# Patient Record
Sex: FEMALE | Race: OTHER | Employment: FULL TIME | URBAN - METROPOLITAN AREA
[De-identification: names, ages, dates, MRNs, and addresses within clinical notes are randomized per-mention and may not be internally consistent; named-entity substitution may affect disease eponyms.]

---

## 2022-07-19 ENCOUNTER — OFFICE VISIT (OUTPATIENT)
Dept: ORTHOPEDIC SURGERY | Age: 42
End: 2022-07-19
Payer: COMMERCIAL

## 2022-07-19 VITALS
BODY MASS INDEX: 39.27 KG/M2 | HEIGHT: 64 IN | HEART RATE: 97 BPM | SYSTOLIC BLOOD PRESSURE: 144 MMHG | WEIGHT: 230 LBS | DIASTOLIC BLOOD PRESSURE: 76 MMHG

## 2022-07-19 DIAGNOSIS — M25.521 PAIN OF BOTH ELBOWS: Primary | ICD-10-CM

## 2022-07-19 DIAGNOSIS — M77.11 BILATERAL TENNIS ELBOW: Primary | ICD-10-CM

## 2022-07-19 DIAGNOSIS — M77.12 BILATERAL TENNIS ELBOW: Primary | ICD-10-CM

## 2022-07-19 DIAGNOSIS — M25.522 PAIN OF BOTH ELBOWS: Primary | ICD-10-CM

## 2022-07-19 PROCEDURE — 99203 OFFICE O/P NEW LOW 30 MIN: CPT | Performed by: PHYSICIAN ASSISTANT

## 2022-07-19 PROCEDURE — 20611 DRAIN/INJ JOINT/BURSA W/US: CPT | Performed by: PHYSICIAN ASSISTANT

## 2022-07-19 RX ORDER — LISINOPRIL 10 MG/1
10 TABLET ORAL DAILY
COMMUNITY
Start: 2022-07-16 | End: 2022-09-29

## 2022-07-19 RX ORDER — LIDOCAINE HYDROCHLORIDE 10 MG/ML
4 INJECTION, SOLUTION INFILTRATION; PERINEURAL ONCE
Status: COMPLETED | OUTPATIENT
Start: 2022-07-19 | End: 2022-07-19

## 2022-07-19 RX ORDER — FAMOTIDINE 20 MG/1
20 TABLET, FILM COATED ORAL 2 TIMES DAILY
COMMUNITY

## 2022-07-19 RX ORDER — ALBUTEROL SULFATE 2.5 MG/3ML
3 SOLUTION RESPIRATORY (INHALATION)
COMMUNITY

## 2022-07-19 RX ORDER — TRIAMCINOLONE ACETONIDE 40 MG/ML
40 INJECTION, SUSPENSION INTRA-ARTICULAR; INTRAMUSCULAR ONCE
Status: COMPLETED | OUTPATIENT
Start: 2022-07-19 | End: 2022-07-19

## 2022-07-19 RX ORDER — CETIRIZINE HYDROCHLORIDE 10 MG/1
10 TABLET ORAL DAILY
COMMUNITY

## 2022-07-19 RX ADMIN — TRIAMCINOLONE ACETONIDE 40 MG: 40 INJECTION, SUSPENSION INTRA-ARTICULAR; INTRAMUSCULAR at 16:38

## 2022-07-19 RX ADMIN — LIDOCAINE HYDROCHLORIDE 4 ML: 10 INJECTION, SOLUTION INFILTRATION; PERINEURAL at 16:37

## 2022-07-19 ASSESSMENT — ENCOUNTER SYMPTOMS
CONSTIPATION: 0
SHORTNESS OF BREATH: 0
APNEA: 0
NAUSEA: 0
COLOR CHANGE: 0
VOMITING: 0
CHEST TIGHTNESS: 0
ABDOMINAL PAIN: 0
RESPIRATORY NEGATIVE: 1
ABDOMINAL DISTENTION: 0
DIARRHEA: 0
COUGH: 0

## 2022-07-19 NOTE — PROGRESS NOTES
[address]  [facility]INE  [address] [place] [address]  Dept: [phone]  Dept Fax: [phone]        Bilateral Elbow- New patient      Subjective:     Chief Complaint   Patient presents with    Elbow Pain     Bilateral     HPI:     Lakeshia Paul is a 39y.o. year old right hand dominant female that has had pain in the bilateral Elbow for 4 weeks. As far as any trauma to the elbow, the patient indicates none. The pain is is the worse at night and when doing activities that involve lifting with the elbow. The pain restricts activities such as lifting and gripping. The pain does not seem to improve with time. The patient has tried Advil, rest, exercises. She has had this on and off since 2016 during both of her pregnancies. She has been back to work since March and has increased pain in both elbows. ROS:   Review of Systems   Constitutional:  Positive for activity change. Negative for appetite change, fatigue and fever. Respiratory: Negative. Negative for apnea, cough, chest tightness and shortness of breath. Cardiovascular: Negative. Negative for chest pain, palpitations and leg swelling. Gastrointestinal:  Negative for abdominal distention, abdominal pain, constipation, diarrhea, nausea and vomiting. Genitourinary:  Negative for difficulty urinating, dysuria and hematuria. Musculoskeletal:  Positive for arthralgias. Negative for gait problem, joint swelling and myalgias. Skin:  Negative for color change and rash. Neurological:  Negative for dizziness, weakness, numbness and headaches. Psychiatric/Behavioral:  Negative for sleep disturbance. Past Medical History:    No past medical history on file. Past Surgical History:    No past surgical history on file.     CurrentMedications:   Current Outpatient Medications   Medication Sig Dispense Refill    lisinopril (PRINIVIL;ZESTRIL) 10 MG tablet Take 10 mg by mouth in the morning. albuterol (PROVENTIL) (2.5 MG/3ML) 0.083% nebulizer solution 3 mLs      cetirizine (ZYRTEC) 10 MG tablet Take 10 mg by mouth in the morning. famotidine (PEPCID) 20 MG tablet Take 20 mg by mouth in the morning and 20 mg before bedtime. No current facility-administered medications for this visit. Allergies:    Latex and Azithromycin    Social History:   Social History     Socioeconomic History    Marital status: Legally      Spouse name: None    Number of children: None    Years of education: None    Highest education level: None   Tobacco Use    Smoking status: Never    Smokeless tobacco: Never       Family History:  No family history on file. I have reviewed the CC, HPI, ROS, PMH, FHX, Social History, and if not present in this note, I have reviewed in the patient's chart. I agree with the documentation provided by other staff and have reviewed their documentation prior to providing my signature indicating agreement. Vitals:   BP (!) 144/76   Pulse 97   Ht 5' 4\" (1.626 m)   Wt 230 lb (104.3 kg)   BMI 39.48 kg/m²  Body mass index is 39.48 kg/m². Physical Examination:     Orthopedics:    GENERAL: Alert and oriented X3 in no acute distress. SKIN: Intact without lesions or ulcerations. NEURO: Musculoskeletal and axillary nerves intact to sensory and motor testing. VASC: Capillary refill is less than 3 seconds. ELBOW Exam    LOCATION: bilateral Elbow  MUSC: Good strength is available in these motions. TESTS: Ligamentous exam is stable to varus and valgus stress testing at 0 and 30 degrees. negative Tinel's sign at elbow. + pain on resisted wrist extension. ROM: 130 degrees of Flexion, 0 degrees of Extension, 90 degrees of Pronation, 90 degrees of Supination  INSPECTION: The patient is tender to palpation over the lateral epicondyle bilaterally. There is no effusion. No obvious deformity of the elbow.  No debra pain or instability to palpation. Assessment:     1. Bilateral tennis elbow      Procedures:    Procedure: yes    Lateral Epicondyle Elbow Injection    Location: Bilateral Elbow  Procedure: The point of maximum tenderness just distal to the lateral epicondyle was identified by palpation and marked with a hollow end of a click type ball point pen. The skin was prepped with a Betadine swab in a sterile fashion. Ultrasound was used to identify the tendinous insertion at the lateral epicondyle. The tendinous insertion was injected under sterile technique with a 3 cc solution containing 2 cc of 1% Lidocaineand 1 cc (40mg) of Depo Medrol. The skin was cleansed and a Band-Aid was placed. The patient tolerated the procedure without difficulty. The patient was told to watch for signs of infection and to call immediately with any problems. Radiology:   XR ELBOW LEFT (MIN 3 VIEWS)    Result Date: 7/20/2022  ELBOW X-RAY Three views of the bilateral elbows were obtained today. This includes AP, Oblique and lateral x-rays. The x-rays of the elbow reveal  no acute fractures, dislocations or soft tissue or bony abnormalities present. No radiopaque foreign bodies. Impression: Negative radiographs of the bilateral elbows. XR ELBOW RIGHT (MIN 3 VIEWS)    Result Date: 7/20/2022  ELBOW X-RAY Three views of the bilateral elbows were obtained today. This includes AP, Oblique and lateral x-rays. The x-rays of the elbow reveal  no acute fractures, dislocations or soft tissue or bony abnormalities present. No radiopaque foreign bodies. Impression: Negative radiographs of the bilateral elbows. Plan:   Treatment : I reviewed the X-ray with the patient and I informed them that the x-ray was negative for any bony abnormalities.  We discussed the etiologies and natural histories of lateral epicondylitis bilateral. We discussed the various treatment alternatives including anti-inflammatory medications, physical therapy, injections, further imaging studies and as a last result surgery. During today's visit, we discussed that she has a history of lateral epicondylitis. 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 She also has a new baby and does lots of lifting. The patient has opted for a cortisone injection into the bilateral lateral epicondyles to help reduce inflammation and pain. The injection site should never get red, hot, or swollen and if it does the patient will contact our office right away. The patient may experience a increase in soreness the first 24-48 hours due to a cortisone flair and can take anti-inflammatories for a short period of time to reduce that soreness. The patient should not submerge the injection site in water for a minimum of 24 hours to avoid infection. This means no lakes, pools, ponds, or hot tubs for 24 hours. If the patient is diabetic the injection may increase their blood sugar for up to one week. The patient can do this cortisone injection once every 3 months as needed. If the injections stop working and do not give the patient relief the patient should consider surgical interventions to produce long term relief. A physical therapy prescription was given. I did include some stretches in her after visit summary. She was also given bilateral cock up wrist splints that she can wear to help with carpal tunnel. The injection should help with her pain and hopefully keep it away for long period of time. Patient should return to the clinic in 6 weeks or PRN to follow up with  Ronny Morillo PA-C. The patient will call the office immediately with any problems. Orders Placed This Encounter   Medications    lidocaine 1 % injection 4 mL    triamcinolone acetonide (KENALOG-40) injection 40 mg    triamcinolone acetonide (KENALOG-40) injection 40 mg         No orders of the defined types were placed in this encounter.       Electronically signed by Bee Browne PA-C, on 7/21/2022 at 7:24 AM

## 2022-09-14 ENCOUNTER — HOSPITAL ENCOUNTER (EMERGENCY)
Age: 42
Discharge: HOME | End: 2022-09-14
Attending: EMERGENCY MEDICINE
Payer: COMMERCIAL

## 2022-09-14 VITALS
DIASTOLIC BLOOD PRESSURE: 106 MMHG | HEART RATE: 106 BPM | BODY MASS INDEX: 38.41 KG/M2 | HEIGHT: 64 IN | TEMPERATURE: 99.6 F | RESPIRATION RATE: 14 BRPM | SYSTOLIC BLOOD PRESSURE: 167 MMHG | WEIGHT: 225 LBS | OXYGEN SATURATION: 100 %

## 2022-09-14 DIAGNOSIS — R21 RASH AND OTHER NONSPECIFIC SKIN ERUPTION: ICD-10-CM

## 2022-09-14 DIAGNOSIS — B35.9 TINEA: Primary | ICD-10-CM

## 2022-09-14 LAB
ABSOLUTE EOS #: 0.5 K/UL (ref 0–0.4)
ABSOLUTE LYMPH #: 2.2 K/UL (ref 1–4.8)
ABSOLUTE MONO #: 0.8 K/UL (ref 0.1–1.2)
ALBUMIN SERPL-MCNC: 3.8 G/DL (ref 3.5–5.2)
ALBUMIN/GLOBULIN RATIO: 1.3 (ref 1–2.5)
ALP BLD-CCNC: 103 U/L (ref 35–104)
ALT SERPL-CCNC: 17 U/L (ref 5–33)
AMYLASE: 45 U/L (ref 28–100)
ANION GAP SERPL CALCULATED.3IONS-SCNC: 12 MMOL/L (ref 9–17)
AST SERPL-CCNC: 12 U/L
BASOPHILS # BLD: 0 % (ref 0–2)
BASOPHILS ABSOLUTE: 0 K/UL (ref 0–0.2)
BILIRUB SERPL-MCNC: 0.8 MG/DL (ref 0.3–1.2)
BILIRUBIN DIRECT: 0.1 MG/DL
BILIRUBIN, INDIRECT: 0.7 MG/DL (ref 0–1)
BUN BLDV-MCNC: 16 MG/DL (ref 6–20)
C-REACTIVE PROTEIN: 17.3 MG/L (ref 0–5)
CALCIUM SERPL-MCNC: 8.8 MG/DL (ref 8.6–10.4)
CHLORIDE BLD-SCNC: 96 MMOL/L (ref 98–107)
CO2: 27 MMOL/L (ref 20–31)
CREAT SERPL-MCNC: 0.62 MG/DL (ref 0.5–0.9)
EOSINOPHILS RELATIVE PERCENT: 4 % (ref 1–4)
GFR AFRICAN AMERICAN: >60 ML/MIN
GFR NON-AFRICAN AMERICAN: >60 ML/MIN
GFR SERPL CREATININE-BSD FRML MDRD: ABNORMAL ML/MIN/{1.73_M2}
GLUCOSE BLD-MCNC: 90 MG/DL (ref 70–99)
HCT VFR BLD CALC: 39.1 % (ref 36–46)
HEMOGLOBIN: 13 G/DL (ref 12–16)
LIPASE: 40 U/L (ref 13–60)
LYMPHOCYTES # BLD: 17 % (ref 24–44)
MCH RBC QN AUTO: 30 PG (ref 26–34)
MCHC RBC AUTO-ENTMCNC: 33.3 G/DL (ref 31–37)
MCV RBC AUTO: 90.4 FL (ref 80–100)
MONOCYTES # BLD: 7 % (ref 2–11)
PDW BLD-RTO: 15.3 % (ref 12.5–15.4)
PLATELET # BLD: 285 K/UL (ref 140–450)
PMV BLD AUTO: 7.1 FL (ref 6–12)
POTASSIUM SERPL-SCNC: 3.5 MMOL/L (ref 3.7–5.3)
RBC # BLD: 4.33 M/UL (ref 4–5.2)
SEDIMENTATION RATE, ERYTHROCYTE: 13 MM/HR (ref 0–20)
SEG NEUTROPHILS: 72 % (ref 36–66)
SEGMENTED NEUTROPHILS ABSOLUTE COUNT: 9.4 K/UL (ref 1.8–7.7)
SODIUM BLD-SCNC: 135 MMOL/L (ref 135–144)
TOTAL PROTEIN: 6.8 G/DL (ref 6.4–8.3)
WBC # BLD: 13 K/UL (ref 3.5–11)

## 2022-09-14 PROCEDURE — 80076 HEPATIC FUNCTION PANEL: CPT

## 2022-09-14 PROCEDURE — 85025 COMPLETE CBC W/AUTO DIFF WBC: CPT

## 2022-09-14 PROCEDURE — 36415 COLL VENOUS BLD VENIPUNCTURE: CPT

## 2022-09-14 PROCEDURE — 86140 C-REACTIVE PROTEIN: CPT

## 2022-09-14 PROCEDURE — 82150 ASSAY OF AMYLASE: CPT

## 2022-09-14 PROCEDURE — 85652 RBC SED RATE AUTOMATED: CPT

## 2022-09-14 PROCEDURE — 99283 EMERGENCY DEPT VISIT LOW MDM: CPT

## 2022-09-14 PROCEDURE — 83690 ASSAY OF LIPASE: CPT

## 2022-09-14 PROCEDURE — 87040 BLOOD CULTURE FOR BACTERIA: CPT

## 2022-09-14 PROCEDURE — 80048 BASIC METABOLIC PNL TOTAL CA: CPT

## 2022-09-14 RX ORDER — LEVALBUTEROL TARTRATE 45 UG/1
1-2 AEROSOL, METERED ORAL EVERY 4 HOURS PRN
COMMUNITY

## 2022-09-14 RX ORDER — NYSTATIN 10B UNIT
POWDER (EA) MISCELLANEOUS 2 TIMES DAILY
COMMUNITY

## 2022-09-14 RX ORDER — ITRACONAZOLE 100 MG/1
CAPSULE ORAL DAILY
COMMUNITY

## 2022-09-14 RX ORDER — CLOTRIMAZOLE 1 %
CREAM (GRAM) TOPICAL 2 TIMES DAILY
COMMUNITY

## 2022-09-14 NOTE — DISCHARGE INSTRUCTIONS
Please continue use of Nystatin powder as well as oral Antifungal as previously directed. DERMATOLOGY CLINIC LIST:     [facility] [address] [facility] [address]   [id number]                 [facility] [address] [facility] [place] [address]   [phone]                 [facility]at[facility] [address] [place] [address]   [phone]       [id number]         [facility] [address] [place] [address] [facility] [address]   [id number]       [phone]          [address] [facility] Dermatology [address] [facility]   [phone]                  [address] [facility] Dermatology [address]  [facility] [address]   [phone]                 [facility] [address] [facility] [address]   [phone]                 [facility]   [phone]                 [facility]   [phone]                 [address] [facility] [address] [facility] [address]   [id number]                              [facility] Dermatology [address] [facility]   [id number]                 [facility] [address] [place] [address]   [id number]       [id number]         [facility] [address] [facility] [address]   [id number]                 [address] [place] [address] Dermatology [address] [facility]       [id number]         [facility] Dermatology [address] [facility] [address]   [phone]                 [facility] [address] [facility] [address]   [phone]                 [facility] [place] [address] [facility] [address] [facility] [address]   [phone]                 [facility] Dermatology [address] [facility] [address] [facility]   [phone]                 [address] [facility] Dermatology [address] [facility] [address] [place] [address]   [phone]                 [facility] [address] [facility] [address] [place] [address] [phone]                 [facility] Dermatology [address]   [phone]                 [facility] Dermatology [address] [facility] [address]   [phone]                 [facility] Dermatology [address]  [facility] [facility] [address]   [phone]

## 2022-09-14 NOTE — ED PROVIDER NOTES
[address] [facility] ED  [address] [facility] [address] [facility] [address]  Phone: [phone]  Fax: [phone]      eMERGENCY dEPARTMENT eNCOUnter      Pt Name: Jai Jeter  MRN: [id number]  [place] 1980  Date of evaluation: 9/14/22      CHIEF COMPLAINT:  Chief Complaint   Patient presents with    Rash     Patient has a rash that started on her right chest and has spread across her chest and back. Patient has been on steroid therapy and antibiotics. Patient has used anti fungal cream.  Patient was seen at [facility] ER and urgent cares. Started last week. HISTORY OF PRESENT ILLNESS    Jai Jeter is a 39 y.o. female who presents with rash complaint:    Location/Symptom:   Rash on body  Timing/Onset:   10 to 14 days  Context/Setting: Patient here for reevaluation of significant itching rash that is been going on for upwards of 10 to 14 days. First evaluation was at an urgent care 7 days ago. At the time she was diagnosed with tinea corporis and provided with nystatin powder as well as oral prednisone taper. Patient states that the prednisone does not seem to help with the rash or itching but states that in certain areas the nystatin powder is helping. She was seen at [facility] emergency department yesterday and was diagnosed again with tinea and provided with an oral antifungal.  She does have a history of mast cell dysfunction, she denies any associated fever/chills/nausea/vomiting. There is no other chest/respiratory/abdominal/urinary pain or symptoms. She is having no sloughing, pain or disruption of any of her mucosal surfaces. She has some history of eczema previously, denies any other diagnosed dermatological past medical history. There are some areas that she reports that are very irritated and open but denies any significant bleeding or purulent discharge.     Quality:   itchy  Duration:   constant  Modifying Factors:   none  Severity:   Moderate    Nursing Notes were reviewed. REVIEW OF SYSTEMS       Constitutional: Denies recent fever, chills. Eyes: No eye pain. No vision changes. Neck: No neck pain. Respiratory: Denies recent shortness of breath. Cardiac:  Denies recent chest pain. GI:  Denies abdominal pain/nausea/vomiting/diarrhea. : Denies dysuria. Musculoskeletal: Denies focal weakness. Neurologic: denies headache or focal weakness. Skin:  Rash     Negative in 10 essential Systems except as mentioned above and in the HPI. PAST MEDICAL HISTORY   PMH:  has no past medical history on file. Surgical History:  has no past surgical history on file. Social History:  reports that she has never smoked. She has never used smokeless tobacco.  Family History: None  Psychiatric History: None    Allergies:is allergic to latex, azithromycin, erythromycin, and neomycin. PHYSICAL EXAM     INITIAL VITALS: BP (!) 167/106   Pulse (!) 106   Temp 99.6 °F (37.6 °C) (Oral)   Resp 14   Ht 5' 4\" (1.626 m)   Wt 102.1 kg (225 lb)   LMP 09/02/2022 (Exact Date)   SpO2 100%   BMI 38.62 kg/m²   Constitutional:  Well developed, no distress or malaise. Well-appearing. Eyes:  Pupils equal/round  HENT:  Atraumatic, external ears normal, nose normal.    Respiratory:  Clear to auscultation bilaterally with good air exchange, no W/R/R  Cardiovascular:  RRR with normal S1 and S2  Gastrointestinal/Abdomen:  Soft, NT.      Musculoskeletal:   Normal to inspection  Back:  No CVA tenderness. Integument:   . Distribution and appearance of rash about her torso. Some areas are macular papular and appearance consistent with a viral exanthem type rash. There are other areas within her skin folds that have more of a tinea corporis appearance. Larger area of her right lateral abdominal wall that is thicker and slightly raised with some excoriation which appears to be from scratching. There is no signs of any secondary infection about any of these areas.   The rash is bilateral and clustered in the areas as well as the macular papular being a little more evenly distributed. There are some areas of vesicular reaction to these but there is not a generalized look of dermatomal/vesicular appearance consistent with zoster. There are no signs of secondary infection or cellulitis. Some scant crusting in areas that is serous in nature. Neurologic:  Alert, appropriate mentation/interaction, no focal deficits noted       DIAGNOSTIC RESULTS     EKG: All EKG's are interpreted by the Emergency Department Physician who either signs or Co-signs this chart in the absence of a cardiologist.  Not indicated    RADIOLOGY:   Reviewed the radiologist:  No orders to display     Not indicated      LABS:  Labs Reviewed   CULTURE, BLOOD 1   CULTURE, BLOOD 1   CBC WITH AUTO DIFFERENTIAL   BASIC METABOLIC PANEL   AMYLASE   LIPASE   HEPATIC FUNCTION PANEL   C-REACTIVE PROTEIN   SEDIMENTATION RATE         EMERGENCY DEPARTMENT COURSE:     1407  Nontoxic, no distress. She has varied rash presentation. Some areas are consistent with tinea, some others are more of a pap/mac rash. No overt signs of Zoster as bilat and scattered, some vesicular reaction to areas but a consistently vesicular/dermatomal appearance. I am having attending look at this as well. Pt is taking Nystatin with some relief in certain area and presently just started oral Antifungal yesterday. No constitutional symptoms or other acute findings on PE. Attending ordering labs for outpt f/u. Continue with topical and oral medicines with use of H1/H2 blockers for her itching symptoms. Attending discharged this patient after discussing all labwork/imaging results that were finalized. Treatment plan and recommended follow-up discussed with them as well. She needs outpt Derm f/u, providing list that I have but no updated [facility] contact within [employer].           No orders of the defined types were placed in this encounter. CONSULTS:  None      FINAL IMPRESSION      1. Tinea    2.  Rash and other nonspecific skin eruption          DISPOSITION/PLAN:  DISPOSITION Decision To Discharge 09/14/2022 02:00:46 PM        PATIENT REFERRED TO:  None Provider    Schedule an appointment as soon as possible for a visit in 2 days  for re-evaluation of your symptoms      DISCHARGE MEDICATIONS:  New Prescriptions    No medications on file       (Please note that portions of this note were completed with a voice recognition program.  Efforts were made to edit the dictations but occasionally words are mis-transcribed.)    ELEAZAR Sargent PA-C  09/14/22 4536

## 2022-09-14 NOTE — ED PROVIDER NOTES
documentation    LABS:  No results found for this visit on 09/14/22. Not indicated unless otherwise documented above or in the midlevel documentation    RADIOLOGY:   I reviewedthe radiologist interpretations:  No orders to display       Not indicated unless otherwise documented above or in the midlevel documentation    EMERGENCY DEPARTMENT COURSE:       PERTINENT ATTENDING PHYSICIAN COMMENTS:    Worsening rash has been to 3 emergency departments. Symptoms started after she was being treated for a viral infection several weeks ago on Zithromax with an allergy to mycins. This started after that. She did was then treated for possible fungal rash and given steroids which seem to make things worse so she stopped the steroids. She is now on an oral antifungal medication. Recommend following up with dermatology. At this point without taking steroids she can continue Benadryl she could also do Pepcid. This does not appear to be shingles it does cross midline. Recommend following up. No orders of the defined types were placed in this encounter. Faculty Attestation    I performed a history and physical examination of the patient and discussed management with the mid level provideer. I reviewed the mid level provider's note and agree with the documented findings and plan of care. Any areas of disagreement are noted on the chart. I was personally present for the key portions of any procedures. I have documented in the chart those procedures where I was not present during the key portions. I have reviewed the emergency nurses triage note. I agree with the chief complaint, past medical history, past surgical history, allergies, medications, social and family history as documented unless otherwise noted below. Documentation of the HPI, Physical Exam and Medical Decision Making performed by medical students or scribes is based on my personal performance of the HPI, PE and MDM.  For Physician Assistant/ Nurse Practitioner cases/documentation I have personally evaluated this patient and have completed at least one if not all key elements of the E/M (history, physical exam, and MDM). Additional findings are as noted.      Kim Boston DO  09/14/22 1423

## 2022-09-19 LAB
CULTURE: NORMAL
CULTURE: NORMAL
Lab: NORMAL
Lab: NORMAL
SPECIMEN DESCRIPTION: NORMAL
SPECIMEN DESCRIPTION: NORMAL

## 2022-09-29 ENCOUNTER — OFFICE VISIT (OUTPATIENT)
Dept: FAMILY MEDICINE CLINIC | Age: 42
End: 2022-09-29
Payer: COMMERCIAL

## 2022-09-29 VITALS
HEIGHT: 64 IN | TEMPERATURE: 98.4 F | DIASTOLIC BLOOD PRESSURE: 93 MMHG | SYSTOLIC BLOOD PRESSURE: 144 MMHG | HEART RATE: 56 BPM | WEIGHT: 233 LBS | BODY MASS INDEX: 39.78 KG/M2 | OXYGEN SATURATION: 98 %

## 2022-09-29 DIAGNOSIS — H65.91 FLUID LEVEL BEHIND TYMPANIC MEMBRANE OF RIGHT EAR: ICD-10-CM

## 2022-09-29 DIAGNOSIS — Z87.09 HISTORY OF ASTHMA: ICD-10-CM

## 2022-09-29 DIAGNOSIS — R03.0 ELEVATED BLOOD PRESSURE READING: ICD-10-CM

## 2022-09-29 DIAGNOSIS — J06.9 ACUTE URI: Primary | ICD-10-CM

## 2022-09-29 PROBLEM — G90.A POTS (POSTURAL ORTHOSTATIC TACHYCARDIA SYNDROME): Status: ACTIVE | Noted: 2022-09-29

## 2022-09-29 PROBLEM — G43.909 MIGRAINE: Status: ACTIVE | Noted: 2022-09-29

## 2022-09-29 PROBLEM — O11.9 CHRONIC HYPERTENSION WITH SUPERIMPOSED PREECLAMPSIA: Status: ACTIVE | Noted: 2021-03-19

## 2022-09-29 PROBLEM — K21.9 GERD (GASTROESOPHAGEAL REFLUX DISEASE): Status: ACTIVE | Noted: 2022-09-29

## 2022-09-29 PROBLEM — E78.5 HYPERLIPIDEMIA: Status: ACTIVE | Noted: 2022-09-29

## 2022-09-29 PROBLEM — O09.299 HISTORY OF PRE-ECLAMPSIA IN PRIOR PREGNANCY, CURRENTLY PREGNANT: Status: ACTIVE | Noted: 2020-08-24

## 2022-09-29 PROBLEM — F33.0 MDD (MAJOR DEPRESSIVE DISORDER), RECURRENT EPISODE, MILD (HCC): Status: ACTIVE | Noted: 2019-10-01

## 2022-09-29 PROBLEM — R76.8 ELEVATED IGE LEVEL: Status: ACTIVE | Noted: 2020-03-02

## 2022-09-29 PROBLEM — E11.9 TYPE 2 DIABETES MELLITUS (HCC): Status: ACTIVE | Noted: 2020-10-22

## 2022-09-29 PROBLEM — E55.9 VITAMIN D DEFICIENCY: Status: ACTIVE | Noted: 2021-04-26

## 2022-09-29 PROBLEM — L50.8 URTICARIA, CHRONIC: Status: ACTIVE | Noted: 2017-05-19

## 2022-09-29 PROCEDURE — 99204 OFFICE O/P NEW MOD 45 MIN: CPT | Performed by: NURSE PRACTITIONER

## 2022-09-29 RX ORDER — MECLIZINE HCL 12.5 MG/1
12.5 TABLET ORAL 3 TIMES DAILY PRN
Qty: 15 TABLET | Refills: 0 | Status: SHIPPED | OUTPATIENT
Start: 2022-09-29 | End: 2022-10-09

## 2022-09-29 RX ORDER — DOXYCYCLINE HYCLATE 100 MG
100 TABLET ORAL 2 TIMES DAILY
Qty: 20 TABLET | Refills: 0 | Status: SHIPPED | OUTPATIENT
Start: 2022-09-29

## 2022-09-29 RX ORDER — PREDNISONE 20 MG/1
20 TABLET ORAL 2 TIMES DAILY
Qty: 10 TABLET | Refills: 0 | Status: SHIPPED | OUTPATIENT
Start: 2022-09-29 | End: 2022-10-04

## 2022-09-29 ASSESSMENT — ENCOUNTER SYMPTOMS
COUGH: 1
CHEST TIGHTNESS: 1
NAUSEA: 1
RHINORRHEA: 0
WHEEZING: 1
SORE THROAT: 0
SHORTNESS OF BREATH: 0
EYE PAIN: 0
VOMITING: 1
DIARRHEA: 1

## 2022-09-29 NOTE — PROGRESS NOTES
CNP   nystatin (MYCOSTATIN) POWD powder Apply topically 2 times daily  Patient not taking: Reported on 9/29/2022  Historical Provider, MD   itraconazole (SPORANOX) 100 MG capsule Take by mouth daily  Patient not taking: Reported on 9/29/2022  Historical Provider, MD   levalbuterol [facility]) 45 MCG/ACT inhaler Inhale 1-2 puffs into the lungs every 4 hours as needed for Wheezing  Historical Provider, MD   clotrimazole (LOTRIMIN) 1 % cream Apply topically 2 times daily Apply topically 2 times daily. Patient not taking: Reported on 9/29/2022  Historical Provider, MD   albuterol (PROVENTIL) (2.5 MG/3ML) 0.083% nebulizer solution 3 mLs  Historical Provider, MD   cetirizine (ZYRTEC) 10 MG tablet Take 10 mg by mouth in the morning. Historical Provider, MD   famotidine (PEPCID) 20 MG tablet Take 20 mg by mouth in the morning and 20 mg before bedtime. Historical Provider, MD       Allergies   Allergen Reactions    Latex      hives    Azithromycin Shortness Of Breath    Bacitracin Rash    Influenza Vaccines Anaphylaxis    Pneumococcal Vaccines Anaphylaxis    Erythromycin Rash    Neomycin Rash         Subjective:      Review of Systems   Constitutional:  Positive for chills and fever. HENT:  Positive for congestion and ear pain. Negative for rhinorrhea and sore throat. Eyes:  Negative for pain and visual disturbance. Respiratory:  Positive for cough, chest tightness and wheezing. Negative for shortness of breath. Cardiovascular:  Negative for chest pain, palpitations and leg swelling. Gastrointestinal:  Positive for diarrhea, nausea and vomiting. Genitourinary:  Negative for decreased urine volume and difficulty urinating. Musculoskeletal:  Negative for gait problem, myalgias and neck pain. Skin:  Negative for pallor and rash. Neurological:  Positive for dizziness. Negative for weakness, light-headedness and headaches. Psychiatric/Behavioral:  Negative for sleep disturbance.       Objective: Physical Exam  Vitals and nursing note reviewed. Constitutional:       General: She is not in acute distress. Appearance: Normal appearance. HENT:      Head: Normocephalic and atraumatic. Jaw: No trismus. Right Ear: Ear canal normal. A middle ear effusion is present. Left Ear: Tympanic membrane and ear canal normal.      Nose: Congestion present. Mouth/Throat:      Lips: Pink. Mouth: Mucous membranes are moist.      Pharynx: Oropharynx is clear. Uvula midline. Eyes:      Extraocular Movements: Extraocular movements intact. Conjunctiva/sclera: Conjunctivae normal.   Cardiovascular:      Rate and Rhythm: Regular rhythm. Bradycardia present. Pulses: Normal pulses. Pulmonary:      Effort: Pulmonary effort is normal. No tachypnea. Breath sounds: Wheezing and rhonchi present. Abdominal:      General: Bowel sounds are normal.      Palpations: Abdomen is soft. Musculoskeletal:         General: Normal range of motion. Cervical back: Normal range of motion and neck supple. Skin:     General: Skin is warm and dry. Capillary Refill: Capillary refill takes less than 2 seconds. Neurological:      Mental Status: She is alert and oriented to person, place, and time. Psychiatric:         Mood and Affect: Mood normal.         Thought Content: Thought content normal.         MEDICAL DECISION MAKING Assessment/Plan:     Deepti Carey was seen today for otalgia and nausea & vomiting. Diagnoses and all orders for this visit:    Acute URI  -     doxycycline hyclate (VIBRA-TABS) 100 MG tablet; Take 1 tablet by mouth 2 times daily  -     predniSONE (DELTASONE) 20 MG tablet; Take 1 tablet by mouth 2 times daily for 5 days    Fluid level behind tympanic membrane of right ear  -     meclizine (ANTIVERT) 12.5 MG tablet; Take 1 tablet by mouth 3 times daily as needed for Nausea or Dizziness  -     predniSONE (DELTASONE) 20 MG tablet;  Take 1 tablet by mouth 2 times daily for 5 days    History of asthma  -     predniSONE (DELTASONE) 20 MG tablet; Take 1 tablet by mouth 2 times daily for 5 days    Elevated blood pressure reading      Results for orders placed or performed during the hospital encounter of 09/14/22   Culture, Blood 1    Specimen: Blood   Result Value Ref Range    Specimen Description . BLOOD     Special Requests 20ML LEFT ARM     Culture NO GROWTH 5 DAYS    Culture, Blood 1    Specimen: Blood   Result Value Ref Range    Specimen Description . BLOOD     Special Requests 20ML RIGHT ARM     Culture NO GROWTH 5 DAYS    CBC with Auto Differential   Result Value Ref Range    WBC 13.0 (H) 3.5 - 11.0 k/uL    RBC 4.33 4.0 - 5.2 m/uL    Hemoglobin 13.0 12.0 - 16.0 g/dL    Hematocrit 39.1 36 - 46 %    MCV 90.4 80 - 100 fL    MCH 30.0 26 - 34 pg    MCHC 33.3 31 - 37 g/dL    RDW 15.3 12.5 - 15.4 %    Platelets 201 508 - 710 k/uL    MPV 7.1 6.0 - 12.0 fL    Seg Neutrophils 72 (H) 36 - 66 %    Lymphocytes 17 (L) 24 - 44 %    Monocytes 7 2 - 11 %    Eosinophils % 4 1 - 4 %    Basophils 0 0 - 2 %    Segs Absolute 9.40 (H) 1.8 - 7.7 k/uL    Absolute Lymph # 2.20 1.0 - 4.8 k/uL    Absolute Mono # 0.80 0.1 - 1.2 k/uL    Absolute Eos # 0.50 (H) 0.0 - 0.4 k/uL    Basophils Absolute 0.00 0.0 - 0.2 k/uL   Basic Metabolic Panel   Result Value Ref Range    Glucose 90 70 - 99 mg/dL    BUN 16 6 - 20 mg/dL    Creatinine 0.62 0.50 - 0.90 mg/dL    Calcium 8.8 8.6 - 10.4 mg/dL    Sodium 135 135 - 144 mmol/L    Potassium 3.5 (L) 3.7 - 5.3 mmol/L    Chloride 96 (L) 98 - 107 mmol/L    CO2 27 20 - 31 mmol/L    Anion Gap 12 9 - 17 mmol/L    GFR Non-African American >60 >60 mL/min    GFR African American >60 >60 mL/min    GFR Comment         Amylase   Result Value Ref Range    Amylase 45 28 - 100 U/L   Lipase   Result Value Ref Range    Lipase 40 13 - 60 U/L   Hepatic Function Panel   Result Value Ref Range    Albumin 3.8 3.5 - 5.2 g/dL    Alkaline Phosphatase 103 35 - 104 U/L    ALT 17 5 - 33 U/L    AST 12 <32 U/L    Total Bilirubin 0.8 0.3 - 1.2 mg/dL    Bilirubin, Direct 0.1 <0.31 mg/dL    Bilirubin, Indirect 0.7 0.00 - 1.00 mg/dL    Total Protein 6.8 6.4 - 8.3 g/dL    Albumin/Globulin Ratio 1.3 1.0 - 2.5   C-Reactive Protein   Result Value Ref Range    CRP 17.3 (H) 0.0 - 5.0 mg/L   Sedimentation Rate   Result Value Ref Range    Sed Rate 13 0 - 20 mm/Hr     Based on the patient's history and exam will treat as URI. The patient does not have clinical findings suggestive of pneumonia. There is no abnormal vital signs (pulse is not greater than 100/ minute, respirations are not greater than 24/ minute, temperature is not greater than 38 degrees Celsius, or oxygen saturation less than 95 percent) There is no tachypnea, rales, or signs of parenchymal consolidation on exam. There are no changes in mental status or behavioral changes. Pt to fill and take medications as prescribed. Doxy and Antivert to take as directed. History of asthma with little relief from inhalers and nebulizer. Will give short course of oral prednisone to help with symptoms. Rest, increase fluids. Return if no improvement in symptoms. Go to the ER for any emergent concern. Pt was advised that blood pressure is elevated today in the office. Instructed patient to follow up with PCP for further evaluation and treatment. Patient given educational materials - see patientinstructions. Discussed use, benefit, and side effects of prescribed medications. All patient questions answered. Pt verbalized understanding. Instructed to continue current medications, diet and exercise. Patient agreed with treatment plan. Follow up as directed.      Electronically signed by MAGDIEL Gant CNP on 9/29/2022 at 12:55 PM

## 2022-09-30 ENCOUNTER — TELEPHONE (OUTPATIENT)
Dept: PRIMARY CARE CLINIC | Age: 42
End: 2022-09-30

## 2022-09-30 DIAGNOSIS — T36.95XA ANTIBIOTIC-INDUCED YEAST INFECTION: Primary | ICD-10-CM

## 2022-09-30 DIAGNOSIS — B37.9 ANTIBIOTIC-INDUCED YEAST INFECTION: Primary | ICD-10-CM

## 2022-09-30 RX ORDER — FLUCONAZOLE 100 MG/1
100 TABLET ORAL DAILY
Qty: 3 TABLET | Refills: 0 | Status: SHIPPED | OUTPATIENT
Start: 2022-09-30 | End: 2022-10-03

## 2022-11-17 ENCOUNTER — OFFICE VISIT (OUTPATIENT)
Dept: ORTHOPEDIC SURGERY | Age: 42
End: 2022-11-17

## 2022-11-17 ENCOUNTER — OFFICE VISIT (OUTPATIENT)
Dept: ORTHOPEDIC SURGERY | Age: 42
End: 2022-11-17
Payer: COMMERCIAL

## 2022-11-17 VITALS — WEIGHT: 233 LBS | RESPIRATION RATE: 12 BRPM | HEIGHT: 64 IN | BODY MASS INDEX: 39.78 KG/M2

## 2022-11-17 VITALS — HEIGHT: 64 IN | WEIGHT: 233 LBS | BODY MASS INDEX: 39.78 KG/M2 | RESPIRATION RATE: 16 BRPM

## 2022-11-17 DIAGNOSIS — M54.2 NECK PAIN: Primary | ICD-10-CM

## 2022-11-17 DIAGNOSIS — M47.22 CERVICAL SPONDYLOSIS WITH RADICULOPATHY: ICD-10-CM

## 2022-11-17 DIAGNOSIS — M54.9 BACK PAIN, UNSPECIFIED BACK LOCATION, UNSPECIFIED BACK PAIN LATERALITY, UNSPECIFIED CHRONICITY: ICD-10-CM

## 2022-11-17 DIAGNOSIS — G89.29 CHRONIC BILATERAL LOW BACK PAIN WITH SCIATICA, SCIATICA LATERALITY UNSPECIFIED: Primary | ICD-10-CM

## 2022-11-17 DIAGNOSIS — M54.40 CHRONIC BILATERAL LOW BACK PAIN WITH SCIATICA, SCIATICA LATERALITY UNSPECIFIED: Primary | ICD-10-CM

## 2022-11-17 DIAGNOSIS — M54.2 NECK PAIN: ICD-10-CM

## 2022-11-17 PROCEDURE — 99204 OFFICE O/P NEW MOD 45 MIN: CPT | Performed by: PHYSICIAN ASSISTANT

## 2022-11-17 RX ORDER — MELOXICAM 15 MG/1
15 TABLET ORAL DAILY
Qty: 30 TABLET | Refills: 0 | Status: SHIPPED | OUTPATIENT
Start: 2022-11-17

## 2022-11-17 RX ORDER — CYCLOBENZAPRINE HCL 10 MG
10 TABLET ORAL NIGHTLY PRN
Qty: 30 TABLET | Refills: 0 | Status: SHIPPED | OUTPATIENT
Start: 2022-11-17 | End: 2022-11-27

## 2022-11-17 NOTE — PROGRESS NOTES
Patient ID: Dilia Galaviz is a 39 y.o. female    Chief Compliant:  No chief complaint on file. Diagnostic imaging:        Assessment and Plan:  No diagnosis found. Follow up ***    HPI:  This is a 39 y.o. female who presents to the clinic today as a new patient for low back evaluation. Review of Systems   All other systems reviewed and are negative. Past History:    Current Outpatient Medications:     doxycycline hyclate (VIBRA-TABS) 100 MG tablet, Take 1 tablet by mouth 2 times daily, Disp: 20 tablet, Rfl: 0    nystatin (MYCOSTATIN) POWD powder, Apply topically 2 times daily (Patient not taking: Reported on 9/29/2022), Disp: , Rfl:     itraconazole (SPORANOX) 100 MG capsule, Take by mouth daily (Patient not taking: Reported on 9/29/2022), Disp: , Rfl:     levalbuterol (XOPENEX HFA) 45 MCG/ACT inhaler, Inhale 1-2 puffs into the lungs every 4 hours as needed for Wheezing, Disp: , Rfl:     clotrimazole (LOTRIMIN) 1 % cream, Apply topically 2 times daily Apply topically 2 times daily. (Patient not taking: Reported on 9/29/2022), Disp: , Rfl:     albuterol (PROVENTIL) (2.5 MG/3ML) 0.083% nebulizer solution, 3 mLs, Disp: , Rfl:     cetirizine (ZYRTEC) 10 MG tablet, Take 10 mg by mouth in the morning., Disp: , Rfl:     famotidine (PEPCID) 20 MG tablet, Take 20 mg by mouth in the morning and 20 mg before bedtime. , Disp: , Rfl:   Allergies   Allergen Reactions    Latex      hives    Azithromycin Shortness Of Breath    Bacitracin Rash    Influenza Vaccines Anaphylaxis    Pneumococcal Vaccines Anaphylaxis    Erythromycin Rash    Neomycin Rash     Social History     Socioeconomic History    Marital status: Legally      Spouse name: Not on file    Number of children: Not on file    Years of education: Not on file    Highest education level: Not on file   Occupational History    Not on file   Tobacco Use    Smoking status: Never    Smokeless tobacco: Never   Substance and Sexual Activity Alcohol use: Not on file    Drug use: Not on file    Sexual activity: Not on file   Other Topics Concern    Not on file   Social History Narrative    Not on file     Social Determinants of Health     Financial Resource Strain: Not on file   Food Insecurity: Not on file   Transportation Needs: Not on file   Physical Activity: Not on file   Stress: Not on file   Social Connections: Not on file   Intimate Partner Violence: Not on file   Housing Stability: Not on file     No past medical history on file. No past surgical history on file. No family history on file. Physical Exam:  Vitals signs and nursing note reviewed. Constitutional:       Appearance: well-developed. HENT:      Head: Normocephalic and atraumatic. Nose: Nose normal.   Eyes:      Conjunctiva/sclera: Conjunctivae normal.   Neck:      Musculoskeletal: Normal range of motion and neck supple. Pulmonary:      Effort: Pulmonary effort is normal. No respiratory distress. Musculoskeletal:      Comments: Normal gait     Skin:     General: Skin is warm and dry. Neurological:      Mental Status: Alert and oriented to person, place, and time. Sensory: No sensory deficit. Psychiatric:         Behavior: Behavior normal.         Thought Content: Thought content normal.        Provider Attestation:  ***    Scribe Attestation:  By signing my name below, I, Kary Chandler, attest that this documentation has been prepared under the direction and in the presence of Dr. Allyn Verduzco. Electronically signed: Darien Baird, 11/17/22     Please note that this chart was generated using voice recognition Dragon dictation software. Although every effort was made to ensure the accuracy of this automated transcription, some errors in transcription may have occurred.

## 2022-11-17 NOTE — PROGRESS NOTES
[facility], [address] [place], [address] [place] [address], [address] [facility]           Dept Phone: [phone]           Dept Fax:  [address]           Petey Dave          Dept Phone: [phone]           Dept Fax:  [phone]      Chief Compliant:  Chief Complaint   Patient presents with    Neck Pain    Lower Back Pain        History of Present Illness: This is a 39 y.o. female who presents to the clinic today for evaluation of chronic neck and low back pain. Patient reports she has had issues with both areas for years but the neck has been significantly worse since June. Neck pain:  Patient reports she has had neck pain for quite some time however has been significantly worse over the last 5 months. 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 Patient reports pain is most severe to the bilateral paraspinal area with radiation up to the occiput. She does note radiation into bilateral upper extremities with numbness and tingling left greater than right however does admit she is dealing with bilateral SLAP lesion so unsure of the etiology of the pain. Back pain:  Patient reports back pain is more chronic in nature localized to the lumbar paraspinal area bilaterally. She reports a history of pregnancy-induced sciatica but they did seem to improve after giving birth. She reports that her back pain additionally has been worse over the past several months without any preceding injury or trauma. She states pain is also aggravated by transition from sit to stand and stand to sit.     Treatment attempted for both the neck and the back include chiropractic care, massage therapy and extensive physical therapy. Patient reports she has done both formal PT but more recently she has been working with a family member who is a orthopedic physical therapist with minimal improvement of pain. Treatment attempted includes Advil Tylenol and Lyrica. Past History:    Current Outpatient Medications:     doxycycline hyclate (VIBRA-TABS) 100 MG tablet, Take 1 tablet by mouth 2 times daily (Patient not taking: Reported on 11/17/2022), Disp: 20 tablet, Rfl: 0    nystatin (MYCOSTATIN) POWD powder, Apply topically 2 times daily (Patient not taking: No sig reported), Disp: , Rfl:     itraconazole (SPORANOX) 100 MG capsule, Take by mouth daily (Patient not taking: No sig reported), Disp: , Rfl:     levalbuterol (XOPENEX HFA) 45 MCG/ACT inhaler, Inhale 1-2 puffs into the lungs every 4 hours as needed for Wheezing, Disp: , Rfl:     clotrimazole (LOTRIMIN) 1 % cream, Apply topically 2 times daily Apply topically 2 times daily. (Patient not taking: No sig reported), Disp: , Rfl:     albuterol (PROVENTIL) (2.5 MG/3ML) 0.083% nebulizer solution, 3 mLs, Disp: , Rfl:     cetirizine (ZYRTEC) 10 MG tablet, Take 10 mg by mouth in the morning., Disp: , Rfl:     famotidine (PEPCID) 20 MG tablet, Take 20 mg by mouth in the morning and 20 mg before bedtime.  (Patient not taking: Reported on 11/17/2022), Disp: , Rfl:   Allergies   Allergen Reactions    Latex      hives    Azithromycin Shortness Of Breath    Bacitracin Rash    Influenza Vaccines Anaphylaxis    Pneumococcal Vaccines Anaphylaxis    Erythromycin Rash    Neomycin Rash     Social History     Socioeconomic History    Marital status: Legally      Spouse name: Not on file    Number of children: Not on file    Years of education: Not on file    Highest education level: Not on file   Occupational History    Not on file   Tobacco Use    Smoking status: Never    Smokeless tobacco: Never   Substance and Sexual Activity    Alcohol use: Not on file    Drug use: Not on file    Sexual activity: Not on file   Other Topics Concern    Not on file   Social History Narrative    Not on file     Social Determinants of Health     Financial Resource Strain: Not on file   Food Insecurity: Not on file   Transportation Needs: Not on file   Physical Activity: Not on file   Stress: Not on file   Social Connections: Not on file   Intimate Partner Violence: Not on file   Housing Stability: Not on file     No past medical history on file. No past surgical history on file. No family history on file. Review of Systems   Constitutional: Negative for fever, chills, sweats. Eyes: Negative for changes in vision, or pain. HENT: Negative for ear ache, epistaxis, or sore throat. Respiratory/Cardio: Negative for Chest pain, palpitations, SOB, or cough. Gastrointestinal: Negative for abdominal pain, N/V/D. Genitourinary: Negative for dysuria, frequency, urgency, or hematuria. Neurological: Negative for headache, numbness, or weakness. Integumentary: Negative for rash, itching, laceration, or abrasion. Musculoskeletal: Positive for Neck Pain and Lower Back Pain       Physical Exam:  Constitutional: Patient is oriented to person, place, and time. Patient appears well-developed and well nourished. HENT: Negative otherwise noted  Head: Normocephalic and Atraumatic  Nose: Normal  Eyes: Conjunctivae and EOM are normal  Neck: Normal range of motion Neck supple. Respiratory/Cardio: Effort normal. No respiratory distress. Musculoskeletal:    CERVICAL EXAMINATION:  Inspection: Local inspection shows no step-off or bruising. Cervical alignment is normal.     Palpation: No evidence of tenderness at the midline, and trapezius. Paraspinal tenderness is present. There is no step-off or paraspinal spasm. Range of Motion: Cervical flexion, extension, and rotation are mildly reduced with pain.   Strength: 5/5 bilateral upper extremities   Special Tests:      Spurling's, L'Hermitte's & Nichols's negative bilaterally. Yuen and Impingement tests are negative bilaterally. Cubital and Carpal tunnel Tinel's negative bilaterally. Skin:There are no rashes, ulcerations or lesions in right & left upper extremities. Gait & station: normal, patient ambulates without assistance     Additional Examinations:       RIGHT UPPER EXTREMITY:  Inspection/examination of the right upper extremity does not show any tenderness, deformity or injury. Range of motion is unremarkable. There is no gross instability. There are no rashes, ulcerations or lesions. Strength and tone are normal.  LEFT UPPER EXTREMITY: Inspection/examination of the left upper extremity does not show any tenderness, deformity or injury. Range of motion is unremarkable. There is no gross instability. There are no rashes, ulcerations or lesions. Strength and tone are normal.      LUMBAR/SACRAL EXAMINATION:  Inspection: Local inspection shows no step-off or bruising. Lumbar alignment is normal.  Sagittal and Coronal balance is neutral.      Palpation:   Moderate tenderness bilaterally at the paraspinal. No evidence of tenderness at the midline. There is no step-off or paraspinal spasm. Range of Motion: Lumbar flexion, extension and rotation are mildly limited due to pain. Strength:   Strength testing is 5/5 in all muscle groups tested. Special Tests:   Straight leg raise and crossed SLR negative. Leg length and pelvis level.  0 out of 5 Yolie's signs. Skin: There are no rashes, ulcerations or lesions. Gait & station: normal, patient ambulates without assistance  Additional Examinations:   RIGHT LOWER EXTREMITY: Inspection/examination of the right lower extremity does not show any tenderness, deformity or injury. Range of motion is unremarkable. There is no gross instability. There are no rashes, ulcerations or lesions.  Strength and tone are normal.  LEFT LOWER EXTREMITY:  Inspection/examination of the left lower extremity does not show any tenderness, deformity or injury. Range of motion is unremarkable. There is no gross instability. There are no rashes, ulcerations or lesions. Strength and tone are normal.    Neurological: Patient is alert and oriented to person, place, and time. Normal strenght. No sensory deficit. Skin: Skin is warm and dry  Psychiatric: Behavior is normal. Thought content normal.  Nursing note and vitals reviewed. Labs and Imaging:     XR taken today:  No results found. X-rays taken in clinic today and preliminarily reviewed by me 11/17/22:  AP and lateral views of the lumbar spine demonstrate Malachy Bimler very mild levoscoliotic curvature on AP view. Mild multilevel lumbar DDD greatest at L3-4 and L4-5. No evidence of spondylitic instability. Hips are visualized on AP view joint spaces overall maintained without evidence of acute fracture, malalignment, AVN or CHIN. AP and lateral views of the cervical spine demonstrate straightening of cervical lordosis. Mild to moderate cervical DDD greatest at C4-5 and C5-6. No evidence of acute fracture. Orders Placed This Encounter   Procedures    XR LUMBAR SPINE (2-3 VIEWS)     Standing Status:   Future     Number of Occurrences:   1     Standing Expiration Date:   11/17/2023    XR CERVICAL SPINE (2-3 VIEWS)     Standing Status:   Future     Number of Occurrences:   1     Standing Expiration Date:   11/17/2023    MRI CERVICAL SPINE WO CONTRAST     Standing Status:   Future     Standing Expiration Date:   11/17/2023    MRI LUMBAR SPINE WO CONTRAST     Standing Status:   Future     Standing Expiration Date:   11/17/2023       Assessment and Plan:  1. Chronic bilateral low back pain with sciatica, sciatica laterality unspecified    2. Neck pain    3. Cervical spondylosis with radiculopathy          PLAN:  Bre Hammond is a 39 y.o. old female who presents for evaluation of chronic neck and low back pain.   Patient with evidence of cervical greater than lumbar DDD concerning with cervical radiculopathy however diagnosis complicated by history of bilateral labral injuries. Recommend further diagnostic evaluation with a MRI of the cervical spine given patient's failure of conservative management including chiropractic care, massage therapy, physical therapy and multiple over-the-counter medications. We will also order an MRI of the lumbar spine given her failure of similar conservative management. Treatment recommended includes Mobic and Flexeril. We did discuss the possibility of initiation of gabapentin however patient will hold off at this time but if desired she may call I be happy to send this electronically. With outpatient follow-up with Dr. Gabriel Oneil after obtaining MRIs. Please note that this chart was generated using voice recognition Dragon dictation software. Although every effort was made to ensure the accuracy of this automated transcription, some errors in transcription may have occurred.

## 2022-11-29 ENCOUNTER — PATIENT MESSAGE (OUTPATIENT)
Dept: ORTHOPEDIC SURGERY | Age: 42
End: 2022-11-29

## 2022-11-29 DIAGNOSIS — M54.2 NECK PAIN: Primary | ICD-10-CM

## 2022-11-29 NOTE — TELEPHONE ENCOUNTER
Dilip Mcqueen,    Please see message below and advise. You mentioned in your LOV note about prescribing gabapentin and it appears that she would like to get that a shot. From: Araceli Freire  To: [employer]  Sent: 11/29/2022  3:44 PM EST  Subject: Neck pain    Hi     My neck pain is excruciating. It seems to be exacerbated by everything including moving my eyes, talking, eating heat isnt working, ice makes it worse. Mobic isnt touching it and neither is Flexeril. I had a massage today which usually helps and its not touched it. Its been right side of base of skull but now both sides. My lower back is not as bad and I havent had as many sudden bathroom urges/ accidents since its calmed down. I have MRI tomorrow but is there anything else? I think Im willing to try gabapentin. Its just bad. Id give it an 8 at the moment.      Silviano Madrigal

## 2022-11-30 ENCOUNTER — HOSPITAL ENCOUNTER (OUTPATIENT)
Dept: MRI IMAGING | Age: 42
Discharge: HOME | End: 2022-12-02
Payer: COMMERCIAL

## 2022-11-30 DIAGNOSIS — M54.40 CHRONIC BILATERAL LOW BACK PAIN WITH SCIATICA, SCIATICA LATERALITY UNSPECIFIED: ICD-10-CM

## 2022-11-30 DIAGNOSIS — G89.29 CHRONIC BILATERAL LOW BACK PAIN WITH SCIATICA, SCIATICA LATERALITY UNSPECIFIED: ICD-10-CM

## 2022-11-30 DIAGNOSIS — M47.22 CERVICAL SPONDYLOSIS WITH RADICULOPATHY: ICD-10-CM

## 2022-11-30 PROCEDURE — 72141 MRI NECK SPINE W/O DYE: CPT

## 2022-11-30 PROCEDURE — 72148 MRI LUMBAR SPINE W/O DYE: CPT

## 2022-11-30 RX ORDER — GABAPENTIN 100 MG/1
100 CAPSULE ORAL 3 TIMES DAILY
Qty: 90 CAPSULE | Refills: 0 | Status: SHIPPED | OUTPATIENT
Start: 2022-11-30 | End: 2022-12-30

## 2022-12-01 DIAGNOSIS — M54.9 BACK PAIN, UNSPECIFIED BACK LOCATION, UNSPECIFIED BACK PAIN LATERALITY, UNSPECIFIED CHRONICITY: ICD-10-CM

## 2022-12-01 DIAGNOSIS — M54.2 NECK PAIN: Primary | ICD-10-CM

## 2022-12-01 RX ORDER — METHYLPREDNISOLONE 4 MG/1
TABLET ORAL
Qty: 1 KIT | Refills: 0 | Status: SHIPPED | OUTPATIENT
Start: 2022-12-01

## 2022-12-01 NOTE — TELEPHONE ENCOUNTER
Gabapentin is safe to take with Mobic. He can increase drowsiness side effects when taken with Flexeril however if spread doses apart should be okay to take.

## 2022-12-01 NOTE — TELEPHONE ENCOUNTER
Patient called in requesting a call from clinical to clarify if she is able to take the gabapentin while on Mobic & flexiril. Please advise thank you!

## 2022-12-01 NOTE — TELEPHONE ENCOUNTER
Notified pt. She stated that she is in extreme amounts of pain and the pain is being triggered by simple movement such as talking, moving her eye, and swallowing. She is also having spasms in her fingers. The gabapentin is not sitting well for her. MRI results were given to her and appt was made for 12/15 with Dr. Jerry Encinas. Sadi Sin is there anything else that you advise for this pt with her increased symptoms until she can see Dr. Jose Mai?

## 2022-12-15 ENCOUNTER — OFFICE VISIT (OUTPATIENT)
Dept: ORTHOPEDIC SURGERY | Age: 42
End: 2022-12-15
Payer: COMMERCIAL

## 2022-12-15 VITALS — HEIGHT: 64 IN | RESPIRATION RATE: 12 BRPM | WEIGHT: 233 LBS | BODY MASS INDEX: 39.78 KG/M2

## 2022-12-15 DIAGNOSIS — M54.9 BACK PAIN, UNSPECIFIED BACK LOCATION, UNSPECIFIED BACK PAIN LATERALITY, UNSPECIFIED CHRONICITY: Primary | ICD-10-CM

## 2022-12-15 DIAGNOSIS — M47.22 CERVICAL SPONDYLOSIS WITH RADICULOPATHY: ICD-10-CM

## 2022-12-15 DIAGNOSIS — M54.40 CHRONIC BILATERAL LOW BACK PAIN WITH SCIATICA, SCIATICA LATERALITY UNSPECIFIED: ICD-10-CM

## 2022-12-15 DIAGNOSIS — M50.20 CERVICAL DISC HERNIATION: ICD-10-CM

## 2022-12-15 DIAGNOSIS — G89.29 CHRONIC BILATERAL LOW BACK PAIN WITH SCIATICA, SCIATICA LATERALITY UNSPECIFIED: ICD-10-CM

## 2022-12-15 PROCEDURE — 99213 OFFICE O/P EST LOW 20 MIN: CPT | Performed by: ORTHOPAEDIC SURGERY

## 2022-12-15 SDOH — HEALTH STABILITY: PHYSICAL HEALTH: ON AVERAGE, HOW MANY MINUTES DO YOU ENGAGE IN EXERCISE AT THIS LEVEL?: 60 MIN

## 2022-12-15 SDOH — HEALTH STABILITY: PHYSICAL HEALTH: ON AVERAGE, HOW MANY DAYS PER WEEK DO YOU ENGAGE IN MODERATE TO STRENUOUS EXERCISE (LIKE A BRISK WALK)?: 5 DAYS

## 2022-12-15 ASSESSMENT — SOCIAL DETERMINANTS OF HEALTH (SDOH)
WITHIN THE LAST YEAR, HAVE YOU BEEN KICKED, HIT, SLAPPED, OR OTHERWISE PHYSICALLY HURT BY YOUR PARTNER OR EX-PARTNER?: PATIENT DECLINED
WITHIN THE LAST YEAR, HAVE YOU BEEN AFRAID OF YOUR PARTNER OR EX-PARTNER?: YES
WITHIN THE LAST YEAR, HAVE YOU BEEN HUMILIATED OR EMOTIONALLY ABUSED IN OTHER WAYS BY YOUR PARTNER OR EX-PARTNER?: YES
WITHIN THE LAST YEAR, HAVE TO BEEN RAPED OR FORCED TO HAVE ANY KIND OF SEXUAL ACTIVITY BY YOUR PARTNER OR EX-PARTNER?: NO

## 2022-12-15 NOTE — PROGRESS NOTES
Patient ID: Nicole Cortes is a 43 y.o. female    Chief Compliant:  Chief Complaint   Patient presents with    Back Pain    Neck Pain        Diagnostic imaging:    MRI cervical spine mild right paracentral disc herniation with some mild right lateral recess stenosis    MRI lumbar spine age-appropriate    Assessment and Plan:  1. Back pain, unspecified back location, unspecified back pain laterality, unspecified chronicity    2. Chronic bilateral low back pain with sciatica, sciatica laterality unspecified    3. Cervical spondylosis with radiculopathy      Acute low back pain patient has been treated with massage therapy and chiropractic this actually has gotten better over time. At this juncture the patient's chief complaint is neck pain predominately right trapezial myalgia with radiation into the eyes lymph nodes and jaw aggravated with eye motion      Low back pain and base of head pain, head pain worse than back pain, without radicular arm pain    Pain management referral for cervical epidurals    Follow up 10 weeks    HPI:  This is a 43 y.o. female who presents to the clinic today as a new patient for neck evaluation and low back evaluation. Patient followed with Eunice Patel PA-C on 11/17/22 for neck pain and low back pain ongoing for years. She complains of low back pain and neck pain. She notes driving 13 hours both ways and lymph node swelling, and worsened pain with certain movements in her eyes will worsen the pain at the base of her head. She experiences transient relief for 10 minutes following with her chiropractor. She has been taking Mobic and steroids without relief but she experiencing significant diarrhea currently. She denies radicular arm pain. Review of Systems   All other systems reviewed and are negative.       Past History:    Current Outpatient Medications:     methylPREDNISolone (MEDROL DOSEPACK) 4 MG tablet, Take by mouth., Disp: 1 kit, Rfl: 0    gabapentin (NEURONTIN) 100 MG capsule, Take 1 capsule by mouth 3 times daily for 30 days. , Disp: 90 capsule, Rfl: 0    meloxicam (MOBIC) 15 MG tablet, Take 1 tablet by mouth daily, Disp: 30 tablet, Rfl: 0    doxycycline hyclate (VIBRA-TABS) 100 MG tablet, Take 1 tablet by mouth 2 times daily (Patient not taking: Reported on 11/17/2022), Disp: 20 tablet, Rfl: 0    nystatin (MYCOSTATIN) POWD powder, Apply topically 2 times daily (Patient not taking: No sig reported), Disp: , Rfl:     itraconazole (SPORANOX) 100 MG capsule, Take by mouth daily (Patient not taking: No sig reported), Disp: , Rfl:     levalbuterol (XOPENEX HFA) 45 MCG/ACT inhaler, Inhale 1-2 puffs into the lungs every 4 hours as needed for Wheezing, Disp: , Rfl:     clotrimazole (LOTRIMIN) 1 % cream, Apply topically 2 times daily Apply topically 2 times daily. (Patient not taking: No sig reported), Disp: , Rfl:     albuterol (PROVENTIL) (2.5 MG/3ML) 0.083% nebulizer solution, 3 mLs, Disp: , Rfl:     cetirizine (ZYRTEC) 10 MG tablet, Take 10 mg by mouth in the morning., Disp: , Rfl:     famotidine (PEPCID) 20 MG tablet, Take 20 mg by mouth in the morning and 20 mg before bedtime.  (Patient not taking: Reported on 11/17/2022), Disp: , Rfl:   Allergies   Allergen Reactions    Latex      hives    Azithromycin Shortness Of Breath    Bacitracin Rash    Influenza Vaccines Anaphylaxis    Pneumococcal Vaccines Anaphylaxis    Erythromycin Rash    Neomycin Rash     Social History     Socioeconomic History    Marital status: Legally      Spouse name: Not on file    Number of children: Not on file    Years of education: Not on file    Highest education level: Not on file   Occupational History    Not on file   Tobacco Use    Smoking status: Never    Smokeless tobacco: Never   Substance and Sexual Activity    Alcohol use: Not on file    Drug use: Not on file    Sexual activity: Not on file   Other Topics Concern    Not on file   Social History Narrative    Not on file below, I, Ritu Omalley, attest that this documentation has been prepared under the direction and in the presence of Dr. Gil Yo. Electronically signed: Darien Lucas, 12/15/22     Please note that this chart was generated using voice recognition Dragon dictation software. Although every effort was made to ensure the accuracy of this automated transcription, some errors in transcription may have occurred.

## 2022-12-21 ENCOUNTER — APPOINTMENT (OUTPATIENT)
Dept: GENERAL RADIOLOGY | Age: 42
End: 2022-12-21
Payer: COMMERCIAL

## 2022-12-21 ENCOUNTER — HOSPITAL ENCOUNTER (EMERGENCY)
Age: 42
Discharge: HOME | End: 2022-12-21
Attending: EMERGENCY MEDICINE
Payer: COMMERCIAL

## 2022-12-21 VITALS
RESPIRATION RATE: 18 BRPM | OXYGEN SATURATION: 97 % | DIASTOLIC BLOOD PRESSURE: 84 MMHG | HEART RATE: 85 BPM | SYSTOLIC BLOOD PRESSURE: 178 MMHG | TEMPERATURE: 99 F

## 2022-12-21 DIAGNOSIS — T78.40XA ALLERGIC REACTION, INITIAL ENCOUNTER: Primary | ICD-10-CM

## 2022-12-21 DIAGNOSIS — J20.9 ACUTE BRONCHITIS, UNSPECIFIED ORGANISM: ICD-10-CM

## 2022-12-21 LAB
ABSOLUTE EOS #: 0.28 K/UL (ref 0–0.44)
ABSOLUTE IMMATURE GRANULOCYTE: 0.06 K/UL (ref 0–0.3)
ABSOLUTE LYMPH #: 2.32 K/UL (ref 1.1–3.7)
ABSOLUTE MONO #: 0.74 K/UL (ref 0.1–1.2)
BASOPHILS # BLD: 1 % (ref 0–2)
BASOPHILS ABSOLUTE: 0.04 K/UL (ref 0–0.2)
EOSINOPHILS RELATIVE PERCENT: 3 % (ref 1–4)
HCT VFR BLD CALC: 37.8 % (ref 36.3–47.1)
HEMOGLOBIN: 12.8 G/DL (ref 11.9–15.1)
IMMATURE GRANULOCYTES: 1 %
LYMPHOCYTES # BLD: 27 % (ref 24–43)
MCH RBC QN AUTO: 29.4 PG (ref 25.2–33.5)
MCHC RBC AUTO-ENTMCNC: 33.9 G/DL (ref 28.4–34.8)
MCV RBC AUTO: 86.9 FL (ref 82.6–102.9)
MONOCYTES # BLD: 9 % (ref 3–12)
NRBC AUTOMATED: 0 PER 100 WBC
PDW BLD-RTO: 13.5 % (ref 11.8–14.4)
PLATELET # BLD: 235 K/UL (ref 138–453)
PMV BLD AUTO: 8.7 FL (ref 8.1–13.5)
RBC # BLD: 4.35 M/UL (ref 3.95–5.11)
SEG NEUTROPHILS: 59 % (ref 36–65)
SEGMENTED NEUTROPHILS ABSOLUTE COUNT: 5.29 K/UL (ref 1.5–8.1)
WBC # BLD: 8.7 K/UL (ref 3.5–11.3)

## 2022-12-21 PROCEDURE — 85025 COMPLETE CBC W/AUTO DIFF WBC: CPT

## 2022-12-21 PROCEDURE — 99284 EMERGENCY DEPT VISIT MOD MDM: CPT

## 2022-12-21 PROCEDURE — 6370000000 HC RX 637 (ALT 250 FOR IP): Performed by: EMERGENCY MEDICINE

## 2022-12-21 PROCEDURE — 96374 THER/PROPH/DIAG INJ IV PUSH: CPT

## 2022-12-21 PROCEDURE — 2500000003 HC RX 250 WO HCPCS: Performed by: EMERGENCY MEDICINE

## 2022-12-21 PROCEDURE — 96375 TX/PRO/DX INJ NEW DRUG ADDON: CPT

## 2022-12-21 PROCEDURE — 71045 X-RAY EXAM CHEST 1 VIEW: CPT

## 2022-12-21 PROCEDURE — 6360000002 HC RX W HCPCS: Performed by: EMERGENCY MEDICINE

## 2022-12-21 RX ORDER — GUAIFENESIN/DEXTROMETHORPHAN 100-10MG/5
5 SYRUP ORAL EVERY 4 HOURS PRN
Status: DISCONTINUED | OUTPATIENT
Start: 2022-12-21 | End: 2022-12-21 | Stop reason: HOSPADM

## 2022-12-21 RX ORDER — FAMOTIDINE 10 MG/ML
20 INJECTION, SOLUTION INTRAVENOUS ONCE
Status: COMPLETED | OUTPATIENT
Start: 2022-12-21 | End: 2022-12-21

## 2022-12-21 RX ORDER — MAGNESIUM HYDROXIDE/ALUMINUM HYDROXICE/SIMETHICONE 120; 1200; 1200 MG/30ML; MG/30ML; MG/30ML
30 SUSPENSION ORAL ONCE
Status: COMPLETED | OUTPATIENT
Start: 2022-12-21 | End: 2022-12-21

## 2022-12-21 RX ORDER — SULFAMETHOXAZOLE AND TRIMETHOPRIM 800; 160 MG/1; MG/1
1 TABLET ORAL 2 TIMES DAILY
Qty: 14 TABLET | Refills: 0 | Status: SHIPPED | OUTPATIENT
Start: 2022-12-21 | End: 2022-12-28

## 2022-12-21 RX ORDER — ONDANSETRON 2 MG/ML
8 INJECTION INTRAMUSCULAR; INTRAVENOUS ONCE
Status: COMPLETED | OUTPATIENT
Start: 2022-12-21 | End: 2022-12-21

## 2022-12-21 RX ORDER — PREDNISONE 20 MG/1
60 TABLET ORAL ONCE
Status: COMPLETED | OUTPATIENT
Start: 2022-12-21 | End: 2022-12-21

## 2022-12-21 RX ORDER — GUAIFENESIN/DEXTROMETHORPHAN 100-10MG/5
5 SYRUP ORAL 3 TIMES DAILY PRN
Qty: 120 ML | Refills: 0 | Status: SHIPPED | OUTPATIENT
Start: 2022-12-21 | End: 2022-12-31

## 2022-12-21 RX ORDER — PREDNISONE 20 MG/1
60 TABLET ORAL DAILY
Qty: 12 TABLET | Refills: 0 | Status: SHIPPED | OUTPATIENT
Start: 2022-12-21 | End: 2022-12-25

## 2022-12-21 RX ADMIN — PREDNISONE 60 MG: 20 TABLET ORAL at 12:09

## 2022-12-21 RX ADMIN — GUAIFENESIN AND DEXTROMETHORPHAN 5 ML: 100; 10 SYRUP ORAL at 12:17

## 2022-12-21 RX ADMIN — ALUMINUM HYDROXIDE, MAGNESIUM HYDROXIDE, AND SIMETHICONE 30 ML: 200; 200; 20 SUSPENSION ORAL at 13:23

## 2022-12-21 RX ADMIN — FAMOTIDINE 20 MG: 10 INJECTION, SOLUTION INTRAVENOUS at 12:09

## 2022-12-21 RX ADMIN — ONDANSETRON 8 MG: 2 INJECTION INTRAMUSCULAR; INTRAVENOUS at 13:23

## 2022-12-21 ASSESSMENT — ENCOUNTER SYMPTOMS
SORE THROAT: 1
COUGH: 1
DIARRHEA: 0
WHEEZING: 0
ABDOMINAL PAIN: 0
SHORTNESS OF BREATH: 0
VOMITING: 0
NAUSEA: 0
FACIAL SWELLING: 0
VOICE CHANGE: 0

## 2022-12-21 NOTE — ED NOTES
Pt alert and oriented x 4, respirations even and unlabored, NAD noted at this time. Will continue to monitor. This patient was assessed by the doctor, Nurse processed and completed the orders from this doctor ie labs, meds, and/or EKG.       [address] [place] [address]  12/21/22 1639

## 2022-12-21 NOTE — DISCHARGE INSTRUCTIONS
Please take the steroids every day until they are gone, take the cough medicine as needed, we are starting on a new antibiotic given your allergy, please take it as directed and call your doctor or return to the emergency department if you have any new or worsening symptoms of allergic reaction
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
## 2023-02-06 ENCOUNTER — INITIAL CONSULT (OUTPATIENT)
Dept: PAIN MANAGEMENT | Age: 43
End: 2023-02-06
Payer: COMMERCIAL

## 2023-02-06 VITALS — WEIGHT: 233 LBS | HEIGHT: 64 IN | BODY MASS INDEX: 39.78 KG/M2

## 2023-02-06 DIAGNOSIS — M54.81 BILATERAL OCCIPITAL NEURALGIA: ICD-10-CM

## 2023-02-06 DIAGNOSIS — M47.812 CERVICAL SPONDYLOSIS: Primary | ICD-10-CM

## 2023-02-06 DIAGNOSIS — M47.817 LUMBOSACRAL SPONDYLOSIS WITHOUT MYELOPATHY: ICD-10-CM

## 2023-02-06 PROCEDURE — 99204 OFFICE O/P NEW MOD 45 MIN: CPT | Performed by: PAIN MEDICINE

## 2023-02-06 ASSESSMENT — PATIENT HEALTH QUESTIONNAIRE - PHQ9
SUM OF ALL RESPONSES TO PHQ QUESTIONS 1-9: 4
SUM OF ALL RESPONSES TO PHQ9 QUESTIONS 1 & 2: 4
SUM OF ALL RESPONSES TO PHQ QUESTIONS 1-9: 4
1. LITTLE INTEREST OR PLEASURE IN DOING THINGS: 3
SUM OF ALL RESPONSES TO PHQ QUESTIONS 1-9: 4
SUM OF ALL RESPONSES TO PHQ QUESTIONS 1-9: 4
DEPRESSION UNABLE TO ASSESS: FUNCTIONAL CAPACITY MOTIVATION LIMITS ACCURACY
2. FEELING DOWN, DEPRESSED OR HOPELESS: 1
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
## 2023-03-06 ENCOUNTER — HOSPITAL ENCOUNTER (EMERGENCY)
Facility: CLINIC | Age: 43
Discharge: HOME | End: 2023-03-06
Attending: EMERGENCY MEDICINE
Payer: COMMERCIAL

## 2023-03-06 ENCOUNTER — APPOINTMENT (OUTPATIENT)
Dept: GENERAL RADIOLOGY | Facility: CLINIC | Age: 43
End: 2023-03-06
Payer: COMMERCIAL

## 2023-03-06 ENCOUNTER — APPOINTMENT (OUTPATIENT)
Dept: CT IMAGING | Facility: CLINIC | Age: 43
End: 2023-03-06
Payer: COMMERCIAL

## 2023-03-06 VITALS
SYSTOLIC BLOOD PRESSURE: 155 MMHG | OXYGEN SATURATION: 99 % | DIASTOLIC BLOOD PRESSURE: 87 MMHG | RESPIRATION RATE: 19 BRPM | TEMPERATURE: 98.6 F | HEART RATE: 83 BPM | BODY MASS INDEX: 43.23 KG/M2 | HEIGHT: 64 IN | WEIGHT: 253.2 LBS

## 2023-03-06 DIAGNOSIS — I10 PRIMARY HYPERTENSION: ICD-10-CM

## 2023-03-06 DIAGNOSIS — I10 ESSENTIAL HYPERTENSION: Primary | ICD-10-CM

## 2023-03-06 LAB
ABSOLUTE EOS #: 0.3 K/UL (ref 0–0.4)
ABSOLUTE LYMPH #: 2.3 K/UL (ref 1–4.8)
ABSOLUTE MONO #: 0.5 K/UL (ref 0.1–1.2)
ALBUMIN SERPL-MCNC: 3.9 G/DL (ref 3.5–5.2)
ALBUMIN/GLOBULIN RATIO: 1.1 (ref 1–2.5)
ALP SERPL-CCNC: 118 U/L (ref 35–104)
ALT SERPL-CCNC: 14 U/L (ref 5–33)
ANION GAP SERPL CALCULATED.3IONS-SCNC: 7 MMOL/L (ref 9–17)
AST SERPL-CCNC: 20 U/L
BASOPHILS # BLD: 1 % (ref 0–2)
BASOPHILS ABSOLUTE: 0 K/UL (ref 0–0.2)
BILIRUB SERPL-MCNC: 0.5 MG/DL (ref 0.3–1.2)
BILIRUBIN URINE: NEGATIVE
BUN SERPL-MCNC: 10 MG/DL (ref 6–20)
CALCIUM SERPL-MCNC: 9.3 MG/DL (ref 8.6–10.4)
CHLORIDE SERPL-SCNC: 102 MMOL/L (ref 98–107)
CO2 SERPL-SCNC: 28 MMOL/L (ref 20–31)
COLOR: YELLOW
COMMENT UA: NORMAL
CREAT SERPL-MCNC: 0.6 MG/DL (ref 0.5–0.9)
EOSINOPHILS RELATIVE PERCENT: 3 % (ref 1–4)
GFR SERPL CREATININE-BSD FRML MDRD: >60 ML/MIN/1.73M2
GLUCOSE SERPL-MCNC: 131 MG/DL (ref 70–99)
GLUCOSE UR STRIP.AUTO-MCNC: NEGATIVE MG/DL
HCG QUALITATIVE: NEGATIVE
HCT VFR BLD AUTO: 37 % (ref 36–46)
HGB BLD-MCNC: 12.6 G/DL (ref 12–16)
KETONES UR STRIP.AUTO-MCNC: NEGATIVE MG/DL
LEUKOCYTE ESTERASE UR QL STRIP.AUTO: NEGATIVE
LYMPHOCYTES # BLD: 28 % (ref 24–44)
MCH RBC QN AUTO: 30.3 PG (ref 26–34)
MCHC RBC AUTO-ENTMCNC: 34.2 G/DL (ref 31–37)
MCV RBC AUTO: 88.7 FL (ref 80–100)
MONOCYTES # BLD: 7 % (ref 2–11)
NITRITE UR QL STRIP.AUTO: NEGATIVE
PDW BLD-RTO: 15.2 % (ref 12.5–15.4)
PLATELET # BLD AUTO: 284 K/UL (ref 140–450)
PMV BLD AUTO: 6.9 FL (ref 6–12)
POTASSIUM SERPL-SCNC: 3.5 MMOL/L (ref 3.7–5.3)
PROT SERPL-MCNC: 7.3 G/DL (ref 6.4–8.3)
PROT UR STRIP.AUTO-MCNC: 5.5 MG/DL (ref 5–8)
PROT UR STRIP.AUTO-MCNC: NEGATIVE MG/DL
RBC # BLD: 4.17 M/UL (ref 4–5.2)
SEG NEUTROPHILS: 61 % (ref 36–66)
SEGMENTED NEUTROPHILS ABSOLUTE COUNT: 5 K/UL (ref 1.8–7.7)
SODIUM SERPL-SCNC: 137 MMOL/L (ref 135–144)
SPECIFIC GRAVITY UA: 1.03 (ref 1–1.03)
TROPONIN I SERPL DL<=0.01 NG/ML-MCNC: <6 NG/L (ref 0–14)
TURBIDITY: CLEAR
URINE HGB: NEGATIVE
UROBILINOGEN, URINE: NORMAL
WBC # BLD AUTO: 8.2 K/UL (ref 3.5–11)

## 2023-03-06 PROCEDURE — 80053 COMPREHEN METABOLIC PANEL: CPT

## 2023-03-06 PROCEDURE — 84484 ASSAY OF TROPONIN QUANT: CPT

## 2023-03-06 PROCEDURE — 36415 COLL VENOUS BLD VENIPUNCTURE: CPT

## 2023-03-06 PROCEDURE — 70450 CT HEAD/BRAIN W/O DYE: CPT

## 2023-03-06 PROCEDURE — 93005 ELECTROCARDIOGRAM TRACING: CPT | Performed by: EMERGENCY MEDICINE

## 2023-03-06 PROCEDURE — 84703 CHORIONIC GONADOTROPIN ASSAY: CPT

## 2023-03-06 PROCEDURE — 81003 URINALYSIS AUTO W/O SCOPE: CPT

## 2023-03-06 PROCEDURE — 85025 COMPLETE CBC W/AUTO DIFF WBC: CPT

## 2023-03-06 PROCEDURE — 71045 X-RAY EXAM CHEST 1 VIEW: CPT

## 2023-03-06 PROCEDURE — 99285 EMERGENCY DEPT VISIT HI MDM: CPT | Performed by: EMERGENCY MEDICINE

## 2023-03-06 ASSESSMENT — LIFESTYLE VARIABLES
HOW OFTEN DO YOU HAVE A DRINK CONTAINING ALCOHOL: NEVER
HOW MANY STANDARD DRINKS CONTAINING ALCOHOL DO YOU HAVE ON A TYPICAL DAY: PATIENT DOES NOT DRINK

## 2023-03-06 ASSESSMENT — ENCOUNTER SYMPTOMS
BACK PAIN: 0
ABDOMINAL PAIN: 0
SORE THROAT: 0
DIARRHEA: 0
COUGH: 0
SHORTNESS OF BREATH: 0
VOMITING: 0
WHEEZING: 0
NAUSEA: 0

## 2023-03-06 ASSESSMENT — PAIN SCALES - GENERAL: PAINLEVEL_OUTOF10: 6

## 2023-03-06 ASSESSMENT — PAIN - FUNCTIONAL ASSESSMENT: PAIN_FUNCTIONAL_ASSESSMENT: 0-10

## 2023-03-06 ASSESSMENT — PAIN DESCRIPTION - LOCATION: LOCATION: HEAD

## 2023-03-06 NOTE — DISCHARGE INSTRUCTIONS
Return to the emergency department if symptoms worsen or persist.  Follow-up with your family doctor as scheduled for tomorrow. Keep a written diary of daily blood pressure reads and take this to your appointment along with your blood pressure cuff to your family doctor.

## 2023-03-06 NOTE — ED PROVIDER NOTES
[facility] ED  [address] [facility]  Phone: [phone]    eMERGENCY dEPARTMENT eNCOUnter        Pt Name: Jai Jeter  MRN: [id number]  [place] 1980  Date of evaluation: 3/6/23    CHIEF COMPLAINT     Chief Complaint   Patient presents with    Headache     Developed 20 mins ago   Base of neck     Dizziness    Blurred [address] IGGY Atkinson is a 43 y.o. female who presents to the emergency department with concerns of hypertension. The patient stated her blood pressure is not spiking intermittently over the past year. She was in doctor Office today seeing the nurse practitioner for a low iron evaluation and work-up. I guess she has no iron stores. Blood pressure in the office was noted to be 169/110. They at that point referred her to her family doctor because she was asymptomatic. Patient stated she ran to [employer] after the appointment and decided to be seen and evaluated because she developed a headache. She denies any dizziness or visual disturbance. No recent head injury or trauma. No weakness or numbness to her face arms or legs. No chest pain or shortness of breath. No abdominal pain nausea or vomiting. No recent falls syncopal episodes or trauma. No recent illness. She denies any difficulty speaking thinking walking or moving. No fever chills cough or congestion. Past medical history intermittent hypertension and iron deficiency anemia which she is working through with hematologist Dr. Gregory Lloyd past surgical history 2 C-sections 3 cardiac ablations dermoid left ovary removed and breast biopsies medications see list allergies see list social history patient denies any tobacco alcohol illicit drug use PCP Dr. Adrián Lyle for which she has an appointment already scheduled for tomorrow last menstrual period was February 18    REVIEW OF SYSTEMS     Review of Systems   Constitutional:  Negative for chills and fever.    HENT:  Negative for congestion, ear pain and sore throat.    Respiratory:  Negative for cough, shortness of breath and wheezing.    Cardiovascular:  Negative for chest pain, palpitations and leg swelling.   Gastrointestinal:  Negative for abdominal pain, diarrhea, nausea and vomiting.   Genitourinary:  Negative for dysuria, flank pain, frequency and hematuria.   Musculoskeletal:  Negative for back pain.   Skin:  Negative for rash.   Neurological:  Negative for dizziness, light-headedness, numbness and headaches.     PAST MEDICAL HISTORY    has no past medical history on file.    SURGICAL HISTORY      has a past surgical history that includes Cardiac electrophysiology study and ablation (2012).    CURRENT MEDICATIONS       Previous Medications    ALBUTEROL (PROVENTIL) (2.5 MG/3ML) 0.083% NEBULIZER SOLUTION    3 mLs    CETIRIZINE (ZYRTEC) 10 MG TABLET    Take 10 mg by mouth in the morning.    CLOTRIMAZOLE (LOTRIMIN) 1 % CREAM    Apply topically 2 times daily Apply topically 2 times daily.    DOXYCYCLINE HYCLATE (VIBRA-TABS) 100 MG TABLET    Take 1 tablet by mouth 2 times daily    FAMOTIDINE (PEPCID) 20 MG TABLET    Take 20 mg by mouth in the morning and 20 mg before bedtime.    GABAPENTIN (NEURONTIN) 100 MG CAPSULE    Take 1 capsule by mouth 3 times daily for 30 days.    ITRACONAZOLE (SPORANOX) 100 MG CAPSULE    Take by mouth daily    LEVALBUTEROL (XOPENEX HFA) 45 MCG/ACT INHALER    Inhale 1-2 puffs into the lungs every 4 hours as needed for Wheezing    MELOXICAM (MOBIC) 15 MG TABLET    Take 1 tablet by mouth daily    METHYLPREDNISOLONE (MEDROL DOSEPACK) 4 MG TABLET    Take by mouth.    NYSTATIN (MYCOSTATIN) POWD POWDER    Apply topically 2 times daily    SEMAGLUTIDE, 1 MG/DOSE, 2 MG/1.5ML SOPN    Inject into the skin       ALLERGIES     is allergic to latex, azithromycin, bacitracin, influenza vaccines, pneumococcal vaccines, erythromycin, and neomycin.    FAMILY HISTORY     has no family status information on file.      family  history is not on file. SOCIAL HISTORY      reports that she has never smoked. She has never used smokeless tobacco.    PHYSICAL EXAM     INITIAL VITALS:  height is 5' 4\" (1.626 m) and weight is 114.9 kg (253 lb 3.2 oz). Her oral temperature is 98.6 °F (37 °C). Her blood pressure is 155/87 (abnormal) and her pulse is 83. Her respiration is 19 and oxygen saturation is 99%. Physical Exam  Vitals and nursing note reviewed. Constitutional:       Appearance: Normal appearance. HENT:      Head: Normocephalic and atraumatic. Nose: Nose normal.      Mouth/Throat:      Mouth: Mucous membranes are moist.   Eyes:      Extraocular Movements: Extraocular movements intact. Pupils: Pupils are equal, round, and reactive to light. Cardiovascular:      Rate and Rhythm: Normal rate and regular rhythm. Pulses: Normal pulses. Heart sounds: Normal heart sounds. Pulmonary:      Effort: Pulmonary effort is normal.      Breath sounds: Normal breath sounds. Abdominal:      General: Abdomen is flat. Bowel sounds are normal.      Palpations: Abdomen is soft. Comments: No pulsatile mass   Musculoskeletal:         General: Normal range of motion. Cervical back: Normal range of motion and neck supple. Skin:     General: Skin is warm and dry. Neurological:      General: No focal deficit present. Mental Status: She is alert and oriented to person, place, and time. Mental status is at baseline.    Psychiatric:         Mood and Affect: Mood normal.         Behavior: Behavior normal.     DIFFERENTIAL DIAGNOSIS / MDM / EMERGENCY DEPARTMENT COURSE:     Differential diagnosis considered: see below    Chronic Conditions affecting care (DM,HTN,CA, etc): htn    Social Determinants of Health affecting care (unable to care for self, lives alone, unemployed, homeless,etc): self    History source(s) (patient,spouse,parent,family,friend,EMS,etc): self    Review of external sources (ECF,Hospital records,EMS report, radiology reports, etc): none    Tests considered but not ordered: Not applicable    Independent interpretation of tests (eg.  X-ray, CAT scan, Doppler studies, EKG): ekg    Discussion of x-ray results with radiology: none    Consults: none    Consideration for admission/observation (even if discharged): see below    Prescription considerations: na    Sepsis considered: na    Critical Care note written: na     The patient was personally seen and evaluated by myself. She was initially hypertensive but upon discharge she was 155/87. She knows its not ideal and she already has a follow-up appointment already scheduled tomorrow with her family doctor, Dr. Luiz Ruffin. He was instructed to keep a written diary and bring that with her blood pressure cuff to every doctor's appointment. Her work-up in the emergency department included a CBC CMP troponin EKG chest x-ray serum pregnancy test as well as urinalysis that did not reveal any acute process. She felt very comfortable going home to follow-up with her PCP and will defer to him regarding needing to start an antihypertensive. Patient at this point feels very comfortable with the plan. There is no indication for transfer for any further testing or admission. DIAGNOSTIC RESULTS   EKG was read interpreted by myself showing normal sinus rhythm ventricular rate of 79 normal axis normal intervals no acute ST or T wave changes of concern. 1 isolated T wave inversion lead III as well as V1. No old EKG available for review.     LABS:  Results for orders placed or performed during the hospital encounter of 03/06/23   CBC with Auto Differential   Result Value Ref Range    WBC 8.2 3.5 - 11.0 k/uL    RBC 4.17 4.0 - 5.2 m/uL    Hemoglobin 12.6 12.0 - 16.0 g/dL    Hematocrit 37.0 36 - 46 %    MCV 88.7 80 - 100 fL    MCH 30.3 26 - 34 pg    MCHC 34.2 31 - 37 g/dL    RDW 15.2 12.5 - 15.4 %    Platelets 074 547 - 090 k/uL    MPV 6.9 6.0 - 12.0 fL    Seg Neutrophils 61 36 - 66 %    Lymphocytes 28 24 - 44 %    Monocytes 7 2 - 11 %    Eosinophils % 3 1 - 4 %    Basophils 1 0 - 2 %    Segs Absolute 5.00 1.8 - 7.7 k/uL    Absolute Lymph # 2.30 1.0 - 4.8 k/uL    Absolute Mono # 0.50 0.1 - 1.2 k/uL    Absolute Eos # 0.30 0.0 - 0.4 k/uL    Basophils Absolute 0.00 0.0 - 0.2 k/uL   CMP   Result Value Ref Range    Glucose 131 (H) 70 - 99 mg/dL    BUN 10 6 - 20 mg/dL    Creatinine 0.60 0.50 - 0.90 mg/dL    Est, Glom Filt Rate >60 >60 mL/min/1.73m2    Calcium 9.3 8.6 - 10.4 mg/dL    Sodium 137 135 - 144 mmol/L    Potassium 3.5 (L) 3.7 - 5.3 mmol/L    Chloride 102 98 - 107 mmol/L    CO2 28 20 - 31 mmol/L    Anion Gap 7 (L) 9 - 17 mmol/L    Alkaline Phosphatase 118 (H) 35 - 104 U/L    ALT 14 5 - 33 U/L    AST 20 <32 U/L    Total Bilirubin 0.5 0.3 - 1.2 mg/dL    Total Protein 7.3 6.4 - 8.3 g/dL    Albumin 3.9 3.5 - 5.2 g/dL    Albumin/Globulin Ratio 1.1 1.0 - 2.5   Troponin   Result Value Ref Range    Troponin, High Sensitivity <6 0 - 14 ng/L   Urinalysis with Reflex to Culture    Specimen: Urine   Result Value Ref Range    Color, UA Yellow Yellow    Turbidity UA Clear Clear    Glucose, Ur NEGATIVE NEGATIVE    Bilirubin Urine NEGATIVE NEGATIVE    Ketones, Urine NEGATIVE NEGATIVE    Specific Gravity, UA 1.027 1.005 - 1.030    Urine Hgb NEGATIVE NEGATIVE    pH, UA 5.5 5.0 - 8.0    Protein, UA NEGATIVE NEGATIVE    Urobilinogen, Urine Normal Normal    Nitrite, Urine NEGATIVE NEGATIVE    Leukocyte Esterase, Urine NEGATIVE NEGATIVE    Urinalysis Comments       Microscopic exam not performed based on chemical results unless requested in original order. Urinalysis Comments          Urinalysis Comments       Utilizing a urinalysis as the only screening method to exclude a potential uropathogen can be unreliable in many patient populations. Rapid screening tests are less sensitive than culture and if UTI is a clinical possibility, culture should be considered despite a negative urinalysis.      HCG Qualitative, Serum   Result Value Ref Range    hCG Qual NEGATIVE NEGATIVE       All other labs were within normal range or not returned as of this dictation. RADIOLOGY:  CT HEAD WO CONTRAST   Final Result   No acute intracranial abnormality. XR CHEST PORTABLE   Final Result   No acute airspace disease identified. I have reviewed the disposition diagnosis with the patient and or their family/guardian. I have answered their questions and givendischarge instructions. They voiced understanding of these instructions and did not have any further questions or complaints. PROCEDURES:  Unless otherwise noted below, none     Procedures    FINAL IMPRESSION      1. Essential hypertension    2. Primary hypertension          DISPOSITION/PLAN   DISPOSITION Decision To Discharge 03/06/2023 01:09:36 PM      PATIENT REFERRED TO:  No follow-up provider specified.     DISCHARGE MEDICATIONS:  New Prescriptions    No medications on file          (Please note that portions of this note were completed with a voice recognition program.  Efforts were made to edit the dictations but occasionally words are mis-transcribed.)    Cristóbal Cunha DO,(electronically signed)  Board Certified Emergency Physician       Cristóbal Cunha DO  03/06/23 3206

## 2023-03-06 NOTE — PROGRESS NOTES
Dr. Marixa Ortega at bedside to discuss blood work and imaging results. Discussed decision to D/C home and follow-up care.   Verbalized understanding

## 2023-03-06 NOTE — PROGRESS NOTES
PT arrives to ED with C/O HA, dizziness and hypertension. Repost having issues with HTN that has been ongoing intermittently for 2 months. Not currently on any meds for BP. Has HX cardiac ablation x's 3 for non-sustained vtach. Last ablation on 2012. Reports developing HA in base of neck 20 PTA, while waiting to be seen, she reports developing dizziness and blurred vision. Denies any chest pain and SOB. No other C/O at this time. EKG completed, PIV inserted and labs obtained. No other C/O at this time. Call light within reach. Will continue to monitor.

## 2023-03-07 LAB
EKG ATRIAL RATE: 79 BPM
EKG P AXIS: 15 DEGREES
EKG P-R INTERVAL: 142 MS
EKG Q-T INTERVAL: 368 MS
EKG QRS DURATION: 86 MS
EKG QTC CALCULATION (BAZETT): 421 MS
EKG R AXIS: 19 DEGREES
EKG T AXIS: 11 DEGREES
EKG VENTRICULAR RATE: 79 BPM

## 2023-06-19 ENCOUNTER — OFFICE VISIT (OUTPATIENT)
Dept: ORTHOPEDIC SURGERY | Age: 43
End: 2023-06-19

## 2023-06-19 VITALS — RESPIRATION RATE: 14 BRPM | WEIGHT: 251 LBS | BODY MASS INDEX: 42.85 KG/M2 | HEIGHT: 64 IN

## 2023-06-19 DIAGNOSIS — M77.11 BILATERAL TENNIS ELBOW: Primary | ICD-10-CM

## 2023-06-19 DIAGNOSIS — M77.12 BILATERAL TENNIS ELBOW: Primary | ICD-10-CM

## 2023-06-19 RX ORDER — AMLODIPINE BESYLATE 5 MG/1
5 TABLET ORAL 2 TIMES DAILY
COMMUNITY
Start: 2023-04-25

## 2023-06-19 RX ORDER — LIDOCAINE HYDROCHLORIDE 10 MG/ML
4 INJECTION, SOLUTION INFILTRATION; PERINEURAL ONCE
Status: COMPLETED | OUTPATIENT
Start: 2023-06-19 | End: 2023-06-20

## 2023-06-19 RX ORDER — LISINOPRIL 20 MG/1
20 TABLET ORAL DAILY
COMMUNITY
Start: 2023-06-01

## 2023-06-19 RX ORDER — FUROSEMIDE 20 MG/1
20 TABLET ORAL DAILY
COMMUNITY
Start: 2023-04-25

## 2023-06-19 RX ORDER — BUPROPION HYDROCHLORIDE 100 MG/1
100 TABLET, EXTENDED RELEASE ORAL 2 TIMES DAILY
COMMUNITY
Start: 2023-06-14

## 2023-06-19 RX ORDER — CARVEDILOL 25 MG/1
50 TABLET ORAL 2 TIMES DAILY
COMMUNITY
Start: 2023-03-21

## 2023-06-19 RX ORDER — METHYLPREDNISOLONE ACETATE 80 MG/ML
80 INJECTION, SUSPENSION INTRA-ARTICULAR; INTRALESIONAL; INTRAMUSCULAR; SOFT TISSUE ONCE
Status: COMPLETED | OUTPATIENT
Start: 2023-06-19 | End: 2023-06-20

## 2023-06-19 ASSESSMENT — ENCOUNTER SYMPTOMS
COLOR CHANGE: 0
CHEST TIGHTNESS: 0
COUGH: 0
NAUSEA: 0
CONSTIPATION: 0
DIARRHEA: 0
VOMITING: 0
SHORTNESS OF BREATH: 0
ABDOMINAL DISTENTION: 0
ABDOMINAL PAIN: 0
APNEA: 0
RESPIRATORY NEGATIVE: 1

## 2023-06-19 NOTE — PROGRESS NOTES
[address] [place] [address] AND SPORTS MEDICINE  [facility] Cari Mai  [address] [address]  Dept: [phone]  Dept Fax: [phone]        Ambulatory Follow Up      Subjective:   Lorraine Estrada is a 43y.o. year old female who presents to our office today for routine followup regarding her   1. Bilateral tennis elbow    . Chief Complaint   Patient presents with    Elbow Pain     B Elbow Pain         HPI Lorraine Estrada  is a 43 y.o. Right hand dominant  female who presents today in follow for elbow pain. The patient was last seen on 7/19/2022 and underwent treatment in the form of bilateral cortisone injections for tennis elbow. The patient notes 100% improvement with the previous treatment until 2 months ago. She states that began hurting again she still using her tennis elbow strap and doing her stretches. She stated having significant pain with gripping and lifting things. She also states she is having difficulty with range of motion. Review of Systems   Constitutional:  Positive for activity change. Negative for appetite change, fatigue and fever. Respiratory: Negative. Negative for apnea, cough, chest tightness and shortness of breath. Cardiovascular: Negative. Negative for chest pain, palpitations and leg swelling. Gastrointestinal:  Negative for abdominal distention, abdominal pain, constipation, diarrhea, nausea and vomiting. Genitourinary:  Negative for difficulty urinating, dysuria and hematuria. Musculoskeletal:  Positive for arthralgias. Negative for gait problem, joint swelling and myalgias. Skin:  Negative for color change and rash. Neurological:  Negative for dizziness, weakness, numbness and headaches. Psychiatric/Behavioral:  Positive for sleep disturbance. Objective :   Resp 14   Ht 5' 4\" (1.626 m)   Wt 251 lb (113.9 kg)   BMI 43.08 kg/m²  Body mass index is 43.08 kg/m².   General:

## 2023-06-19 NOTE — PATIENT INSTRUCTIONS
CORTISONE INJECTION CARE    The injection site should never get red, hot, or swollen and if it does the patient will contact our office right away. The patient may experience a increase in soreness the first 24-48 hours due to a cortisone flair and can take anti-inflammatories for a short period of time to reduce that soreness. The patient should not submerge the injection site in water for a minimum of 24 hours to avoid infection. This means no lakes, pools, ponds, or hot tubs for 24 hours. If the patient is diabetic the injection may increase their blood sugar for up to one week. The patient can do this cortisone injection once every 4 months as needed.

## 2023-06-20 RX ADMIN — LIDOCAINE HYDROCHLORIDE 4 ML: 10 INJECTION, SOLUTION INFILTRATION; PERINEURAL at 09:26

## 2023-06-20 RX ADMIN — METHYLPREDNISOLONE ACETATE 80 MG: 80 INJECTION, SUSPENSION INTRA-ARTICULAR; INTRALESIONAL; INTRAMUSCULAR; SOFT TISSUE at 09:27

## 2023-09-13 ENCOUNTER — TELEPHONE (OUTPATIENT)
Dept: NEUROLOGY | Age: 43
End: 2023-09-13

## 2023-09-13 NOTE — TELEPHONE ENCOUNTER
09 13 2023 I called the patient times 2 (08 14 2023 and 09 06 2023 at  [phone]) to schedule new patient appointment with one of our providers, MICK both times, no response. I mailed the patient a letter asking them to call the office back to schedule this appointment.   KS

## 2024-02-25 ENCOUNTER — HOSPITAL ENCOUNTER (EMERGENCY)
Age: 44
Discharge: HOME | End: 2024-02-25
Attending: EMERGENCY MEDICINE
Payer: COMMERCIAL

## 2024-02-25 ENCOUNTER — APPOINTMENT (OUTPATIENT)
Dept: CT IMAGING | Age: 44
End: 2024-02-25
Payer: COMMERCIAL

## 2024-02-25 ENCOUNTER — APPOINTMENT (OUTPATIENT)
Dept: GENERAL RADIOLOGY | Age: 44
End: 2024-02-25
Payer: COMMERCIAL

## 2024-02-25 VITALS
OXYGEN SATURATION: 97 % | RESPIRATION RATE: 24 BRPM | TEMPERATURE: 98.2 F | WEIGHT: 240 LBS | BODY MASS INDEX: 40.97 KG/M2 | DIASTOLIC BLOOD PRESSURE: 61 MMHG | SYSTOLIC BLOOD PRESSURE: 159 MMHG | HEART RATE: 92 BPM | HEIGHT: 64 IN

## 2024-02-25 DIAGNOSIS — R07.9 CHEST PAIN, UNSPECIFIED TYPE: ICD-10-CM

## 2024-02-25 DIAGNOSIS — I10 HYPERTENSION, UNSPECIFIED TYPE: Primary | ICD-10-CM

## 2024-02-25 LAB
ALBUMIN SERPL-MCNC: 3.5 G/DL (ref 3.5–5.2)
ALBUMIN/GLOB SERPL: 1.1 {RATIO} (ref 1–2.5)
ALP SERPL-CCNC: 120 U/L (ref 35–104)
ALT SERPL-CCNC: <5 U/L (ref 5–33)
ANION GAP SERPL CALCULATED.3IONS-SCNC: 10 MMOL/L (ref 9–17)
AST SERPL-CCNC: 14 U/L
BASOPHILS # BLD: 0.1 K/UL (ref 0–0.2)
BASOPHILS NFR BLD: 1 % (ref 0–2)
BILIRUB SERPL-MCNC: 0.3 MG/DL (ref 0.3–1.2)
BUN SERPL-MCNC: 7 MG/DL (ref 6–20)
CALCIUM SERPL-MCNC: 9 MG/DL (ref 8.6–10.4)
CHLORIDE SERPL-SCNC: 104 MMOL/L (ref 98–107)
CO2 SERPL-SCNC: 26 MMOL/L (ref 20–31)
CREAT SERPL-MCNC: 0.7 MG/DL (ref 0.5–0.9)
EOSINOPHIL # BLD: 0.3 K/UL (ref 0–0.4)
EOSINOPHILS RELATIVE PERCENT: 3 % (ref 1–4)
ERYTHROCYTE [DISTWIDTH] IN BLOOD BY AUTOMATED COUNT: 14.2 % (ref 12.5–15.4)
GFR SERPL CREATININE-BSD FRML MDRD: >60 ML/MIN/1.73M2
GLUCOSE SERPL-MCNC: 135 MG/DL (ref 70–99)
HCT VFR BLD AUTO: 34.6 % (ref 36–46)
HGB BLD-MCNC: 11.7 G/DL (ref 12–16)
LIPASE SERPL-CCNC: 43 U/L (ref 13–60)
LYMPHOCYTES NFR BLD: 2.4 K/UL (ref 1–4.8)
LYMPHOCYTES RELATIVE PERCENT: 29 % (ref 24–44)
MCH RBC QN AUTO: 28.9 PG (ref 26–34)
MCHC RBC AUTO-ENTMCNC: 33.8 G/DL (ref 31–37)
MCV RBC AUTO: 85.6 FL (ref 80–100)
MONOCYTES NFR BLD: 0.4 K/UL (ref 0.1–1.2)
MONOCYTES NFR BLD: 5 % (ref 2–11)
NEUTROPHILS NFR BLD: 62 % (ref 36–66)
NEUTS SEG NFR BLD: 5.1 K/UL (ref 1.8–7.7)
PLATELET # BLD AUTO: 260 K/UL (ref 140–450)
PMV BLD AUTO: 7 FL (ref 6–12)
POTASSIUM SERPL-SCNC: 3.8 MMOL/L (ref 3.7–5.3)
PROT SERPL-MCNC: 6.7 G/DL (ref 6.4–8.3)
RBC # BLD AUTO: 4.04 M/UL (ref 4–5.2)
SODIUM SERPL-SCNC: 140 MMOL/L (ref 135–144)
TROPONIN I SERPL HS-MCNC: <6 NG/L (ref 0–14)
WBC OTHER # BLD: 8.3 K/UL (ref 3.5–11)

## 2024-02-25 PROCEDURE — 6360000004 HC RX CONTRAST MEDICATION: Performed by: EMERGENCY MEDICINE

## 2024-02-25 PROCEDURE — 99285 EMERGENCY DEPT VISIT HI MDM: CPT

## 2024-02-25 PROCEDURE — 80053 COMPREHEN METABOLIC PANEL: CPT

## 2024-02-25 PROCEDURE — 93005 ELECTROCARDIOGRAM TRACING: CPT | Performed by: NURSE PRACTITIONER

## 2024-02-25 PROCEDURE — 83690 ASSAY OF LIPASE: CPT

## 2024-02-25 PROCEDURE — 70498 CT ANGIOGRAPHY NECK: CPT

## 2024-02-25 PROCEDURE — 84484 ASSAY OF TROPONIN QUANT: CPT

## 2024-02-25 PROCEDURE — 2580000003 HC RX 258: Performed by: EMERGENCY MEDICINE

## 2024-02-25 PROCEDURE — 70450 CT HEAD/BRAIN W/O DYE: CPT

## 2024-02-25 PROCEDURE — 36415 COLL VENOUS BLD VENIPUNCTURE: CPT

## 2024-02-25 PROCEDURE — 71045 X-RAY EXAM CHEST 1 VIEW: CPT

## 2024-02-25 PROCEDURE — 85025 COMPLETE CBC W/AUTO DIFF WBC: CPT

## 2024-02-25 RX ORDER — SODIUM CHLORIDE 0.9 % (FLUSH) 0.9 %
10 SYRINGE (ML) INJECTION PRN
Status: DISCONTINUED | OUTPATIENT
Start: 2024-02-25 | End: 2024-02-25 | Stop reason: HOSPADM

## 2024-02-25 RX ORDER — 0.9 % SODIUM CHLORIDE 0.9 %
80 INTRAVENOUS SOLUTION INTRAVENOUS ONCE
Status: DISCONTINUED | OUTPATIENT
Start: 2024-02-25 | End: 2024-02-25 | Stop reason: HOSPADM

## 2024-02-25 RX ADMIN — IOPAMIDOL 100 ML: 755 INJECTION, SOLUTION INTRAVENOUS at 18:28

## 2024-02-25 RX ADMIN — Medication 80 ML: at 18:29

## 2024-02-25 RX ADMIN — SODIUM CHLORIDE, PRESERVATIVE FREE 10 ML: 5 INJECTION INTRAVENOUS at 18:28

## 2024-02-25 ASSESSMENT — HEART SCORE: ECG: 1

## 2024-02-25 ASSESSMENT — PAIN DESCRIPTION - ORIENTATION: ORIENTATION: LEFT

## 2024-02-25 ASSESSMENT — PAIN - FUNCTIONAL ASSESSMENT: PAIN_FUNCTIONAL_ASSESSMENT: 0-10

## 2024-02-25 ASSESSMENT — PAIN DESCRIPTION - LOCATION: LOCATION: ARM;CHEST;NECK

## 2024-02-25 NOTE — ED PROVIDER NOTES
[facility] EMERGENCY DEPARTMENT  EMERGENCY DEPARTMENT ENCOUNTER      Pt Name: Christa Gutierrez  MRN: [id number]  Birthdate 1980  Date of evaluation: 2/25/2024  Provider: MAGDIEL Yañez CNP  7:33 PM    CHIEF COMPLAINT     No chief complaint on file.        HISTORY OF PRESENT ILLNESS    Christa Gutierrez is a 43 y.o. female who presents to the emergency department for evaluation of chest pain.  Patient states that she has had intermittent chest pain over the past 2 days but today it became constant.  The past few days she has noticed that it is worse with movement, picking up her daughter, buckling her seatbelt etc.  It she is not short of breath.  Today she checked her blood pressure and it remains high.  She is on 3 different medications for her blood pressure.  History of hypertensive urgency in the past.  She also has history of 3 episodes of V. tach that she required ablations for.  She describes the pain radiating up into her neck and into her left arm.  No injury.  She also describes indigestion    HPI    Nursing Notes were reviewed.    REVIEW OF SYSTEMS       Review of Systems   All other systems reviewed and are negative.      Except as noted above the remainder of the review of systems was reviewed and negative.       PAST MEDICAL HISTORY   History reviewed. No pertinent past medical history.      SURGICAL HISTORY       Past Surgical History:   Procedure Laterality Date    CARDIAC ELECTROPHYSIOLOGY STUDY AND ABLATION  2012         CURRENT MEDICATIONS       Previous Medications    ALBUTEROL (PROVENTIL) (2.5 MG/3ML) 0.083% NEBULIZER SOLUTION    3 mLs    AMLODIPINE (NORVASC) 5 MG TABLET    Take 1 tablet by mouth 2 times daily    BUPROPION (WELLBUTRIN SR) 100 MG EXTENDED RELEASE TABLET    Take 1 tablet by mouth 2 times daily    CARVEDILOL (COREG) 25 MG TABLET    Take 2 tablets by mouth 2 times daily    CETIRIZINE (ZYRTEC) 10 MG TABLET    Take 10 mg by mouth in the morning.    
and the radiologist interpretations are reviewed as follows:     XR CHEST PORTABLE   Final Result   No acute cardiopulmonary abnormality.         CT Head W/O Contrast    (Results Pending)   CTA HEAD NECK W CONTRAST    (Results Pending)       LABS:  Results for orders placed or performed during the hospital encounter of 02/25/24   CBC with Auto Differential   Result Value Ref Range    WBC 8.3 3.5 - 11.0 k/uL    RBC 4.04 4.0 - 5.2 m/uL    Hemoglobin 11.7 (L) 12.0 - 16.0 g/dL    Hematocrit 34.6 (L) 36 - 46 %    MCV 85.6 80 - 100 fL    MCH 28.9 26 - 34 pg    MCHC 33.8 31 - 37 g/dL    RDW 14.2 12.5 - 15.4 %    Platelets 260 140 - 450 k/uL    MPV 7.0 6.0 - 12.0 fL    Neutrophils % 62 36 - 66 %    Lymphocytes % 29 24 - 44 %    Monocytes % 5 2 - 11 %    Eosinophils % 3 1 - 4 %    Basophils % 1 0 - 2 %    Neutrophils Absolute 5.10 1.8 - 7.7 k/uL    Lymphocytes Absolute 2.40 1.0 - 4.8 k/uL    Monocytes Absolute 0.40 0.1 - 1.2 k/uL    Eosinophils Absolute 0.30 0.0 - 0.4 k/uL    Basophils Absolute 0.10 0.0 - 0.2 k/uL   CMP   Result Value Ref Range    Sodium 140 135 - 144 mmol/L    Potassium 3.8 3.7 - 5.3 mmol/L    Chloride 104 98 - 107 mmol/L    CO2 26 20 - 31 mmol/L    Anion Gap 10 9 - 17 mmol/L    Glucose 135 (H) 70 - 99 mg/dL    BUN 7 6 - 20 mg/dL    Creatinine 0.7 0.5 - 0.9 mg/dL    Est, Glom Filt Rate >60 >60 mL/min/1.73m2    Calcium 9.0 8.6 - 10.4 mg/dL    Total Protein 6.7 6.4 - 8.3 g/dL    Albumin 3.5 3.5 - 5.2 g/dL    Albumin/Globulin Ratio 1.1 1.0 - 2.5    Total Bilirubin 0.3 0.3 - 1.2 mg/dL    Alkaline Phosphatase 120 (H) 35 - 104 U/L    ALT <5 (L) 5 - 33 U/L    AST 14 <32 U/L   Lipase   Result Value Ref Range    Lipase 43 13 - 60 U/L   Troponin   Result Value Ref Range    Troponin, High Sensitivity <6 0 - 14 ng/L           EMERGENCY DEPARTMENT COURSE:   Vitals:    Vitals:    02/25/24 1715 02/25/24 1730 02/25/24 1745 02/25/24 1800   BP: (!) 145/69 (!) 147/55 (!) 156/75 (!) 159/61   Pulse: 89 88 90 92   Resp: 20 21 20

## 2024-02-26 NOTE — DISCHARGE INSTRUCTIONS
Home.  Continue to monitor your blood pressure.  Take medications as prescribed.  Call your cardiologist office first thing in the morning to get further direction.  Return immediately for chest pain, shortness of breath, worsening symptoms in any way, blood pressure that is not controlled, vision changes, headache, numbness tingling weakness or any other concerns

## 2024-02-28 LAB
EKG ATRIAL RATE: 85 BPM
EKG P AXIS: 32 DEGREES
EKG P-R INTERVAL: 154 MS
EKG Q-T INTERVAL: 360 MS
EKG QRS DURATION: 86 MS
EKG QTC CALCULATION (BAZETT): 428 MS
EKG R AXIS: 20 DEGREES
EKG T AXIS: 11 DEGREES
EKG VENTRICULAR RATE: 85 BPM